# Patient Record
Sex: MALE | Employment: UNEMPLOYED | URBAN - METROPOLITAN AREA
[De-identification: names, ages, dates, MRNs, and addresses within clinical notes are randomized per-mention and may not be internally consistent; named-entity substitution may affect disease eponyms.]

---

## 2017-05-20 ENCOUNTER — HOSPITAL ENCOUNTER (INPATIENT)
Facility: HOSPITAL | Age: 57
LOS: 9 days | Discharge: HOME | DRG: 414 | End: 2017-05-29
Attending: HOSPITALIST | Admitting: HOSPITALIST
Payer: MEDICARE

## 2017-05-20 DIAGNOSIS — N39.0 URINARY TRACT INFECTION WITHOUT HEMATURIA, SITE UNSPECIFIED: ICD-10-CM

## 2017-05-20 DIAGNOSIS — I81 PORTAL VEIN THROMBOSIS: ICD-10-CM

## 2017-05-20 DIAGNOSIS — Z72.0 TOBACCO ABUSE: ICD-10-CM

## 2017-05-20 DIAGNOSIS — K80.43 CALCULUS OF BILE DUCT WITH ACUTE CHOLECYSTITIS AND OBSTRUCTION: ICD-10-CM

## 2017-05-20 DIAGNOSIS — E87.6 HYPOKALEMIA: ICD-10-CM

## 2017-05-20 DIAGNOSIS — F10.10 ALCOHOL ABUSE: ICD-10-CM

## 2017-05-20 DIAGNOSIS — E72.20 HYPERAMMONEMIA: ICD-10-CM

## 2017-05-20 DIAGNOSIS — K83.1 OBSTRUCTIVE JAUNDICE: Primary | ICD-10-CM

## 2017-05-20 DIAGNOSIS — Z79.899 MEDICATION DOSE CHANGED: ICD-10-CM

## 2017-05-20 PROBLEM — R74.01 TRANSAMINITIS: Status: ACTIVE | Noted: 2017-05-20

## 2017-05-20 PROBLEM — R82.71 BACTERIURIA: Status: ACTIVE | Noted: 2017-05-20

## 2017-05-20 PROBLEM — E88.09 HYPOALBUMINEMIA DUE TO PROTEIN-CALORIE MALNUTRITION: Status: ACTIVE | Noted: 2017-05-20

## 2017-05-20 PROBLEM — E83.39 HYPOPHOSPHATEMIA: Status: ACTIVE | Noted: 2017-05-20

## 2017-05-20 PROBLEM — D72.829 LEUKOCYTOSIS: Status: ACTIVE | Noted: 2017-05-20

## 2017-05-20 PROBLEM — E46 HYPOALBUMINEMIA DUE TO PROTEIN-CALORIE MALNUTRITION: Status: ACTIVE | Noted: 2017-05-20

## 2017-05-20 LAB
ABO + RH BLD: NORMAL
AFP SERPL-MCNC: 1.8 NG/ML
ALBUMIN SERPL BCP-MCNC: 2.3 G/DL
ALP SERPL-CCNC: 853 U/L
ALT SERPL W/O P-5'-P-CCNC: 206 U/L
AMMONIA PLAS-SCNC: 50 UMOL/L
ANION GAP SERPL CALC-SCNC: 10 MMOL/L
ANION GAP SERPL CALC-SCNC: 12 MMOL/L
AST SERPL-CCNC: 283 U/L
BASOPHILS # BLD AUTO: 0.03 K/UL
BASOPHILS NFR BLD: 0.2 %
BILIRUB SERPL-MCNC: 20.9 MG/DL
BLD GP AB SCN CELLS X3 SERPL QL: NORMAL
BUN SERPL-MCNC: 10 MG/DL
BUN SERPL-MCNC: 12 MG/DL
CALCIUM SERPL-MCNC: 8.4 MG/DL
CALCIUM SERPL-MCNC: 8.6 MG/DL
CANCER AG19-9 SERPL-ACNC: 394 U/ML
CHLORIDE SERPL-SCNC: 93 MMOL/L
CHLORIDE SERPL-SCNC: 98 MMOL/L
CO2 SERPL-SCNC: 26 MMOL/L
CO2 SERPL-SCNC: 28 MMOL/L
CREAT SERPL-MCNC: 0.6 MG/DL
CREAT SERPL-MCNC: 0.7 MG/DL
DIFFERENTIAL METHOD: ABNORMAL
EOSINOPHIL # BLD AUTO: 0 K/UL
EOSINOPHIL NFR BLD: 0.2 %
ERYTHROCYTE [DISTWIDTH] IN BLOOD BY AUTOMATED COUNT: 16.9 %
EST. GFR  (AFRICAN AMERICAN): >60 ML/MIN/1.73 M^2
EST. GFR  (AFRICAN AMERICAN): >60 ML/MIN/1.73 M^2
EST. GFR  (NON AFRICAN AMERICAN): >60 ML/MIN/1.73 M^2
EST. GFR  (NON AFRICAN AMERICAN): >60 ML/MIN/1.73 M^2
FOLATE SERPL-MCNC: 4.2 NG/ML
GLUCOSE SERPL-MCNC: 101 MG/DL
GLUCOSE SERPL-MCNC: 84 MG/DL
HCT VFR BLD AUTO: 33.1 %
HGB BLD-MCNC: 11.6 G/DL
HIV1+2 IGG SERPL QL IA.RAPID: NEGATIVE
INR PPP: 1.1
LIPASE SERPL-CCNC: 26 U/L
LYMPHOCYTES # BLD AUTO: 2.2 K/UL
LYMPHOCYTES NFR BLD: 13.4 %
MAGNESIUM SERPL-MCNC: 1.6 MG/DL
MCH RBC QN AUTO: 29.1 PG
MCHC RBC AUTO-ENTMCNC: 35 %
MCV RBC AUTO: 83 FL
MONOCYTES # BLD AUTO: 1.7 K/UL
MONOCYTES NFR BLD: 10.7 %
NEUTROPHILS # BLD AUTO: 12 K/UL
NEUTROPHILS NFR BLD: 74.6 %
PHOSPHATE SERPL-MCNC: 2.5 MG/DL
PLATELET # BLD AUTO: 326 K/UL
PMV BLD AUTO: 10.7 FL
POTASSIUM SERPL-SCNC: 2.7 MMOL/L
POTASSIUM SERPL-SCNC: 3.5 MMOL/L
PROT SERPL-MCNC: 5.9 G/DL
PROTHROMBIN TIME: 11.6 SEC
RBC # BLD AUTO: 3.98 M/UL
SODIUM SERPL-SCNC: 133 MMOL/L
SODIUM SERPL-SCNC: 134 MMOL/L
WBC # BLD AUTO: 16.1 K/UL

## 2017-05-20 PROCEDURE — 87086 URINE CULTURE/COLONY COUNT: CPT

## 2017-05-20 PROCEDURE — 99223 1ST HOSP IP/OBS HIGH 75: CPT | Mod: GC,,, | Performed by: HOSPITALIST

## 2017-05-20 PROCEDURE — 86901 BLOOD TYPING SEROLOGIC RH(D): CPT

## 2017-05-20 PROCEDURE — 82105 ALPHA-FETOPROTEIN SERUM: CPT

## 2017-05-20 PROCEDURE — 80053 COMPREHEN METABOLIC PANEL: CPT

## 2017-05-20 PROCEDURE — 86900 BLOOD TYPING SEROLOGIC ABO: CPT

## 2017-05-20 PROCEDURE — 82746 ASSAY OF FOLIC ACID SERUM: CPT

## 2017-05-20 PROCEDURE — 93010 ELECTROCARDIOGRAM REPORT: CPT | Mod: ,,, | Performed by: INTERNAL MEDICINE

## 2017-05-20 PROCEDURE — 83735 ASSAY OF MAGNESIUM: CPT

## 2017-05-20 PROCEDURE — 85610 PROTHROMBIN TIME: CPT

## 2017-05-20 PROCEDURE — 87077 CULTURE AEROBIC IDENTIFY: CPT

## 2017-05-20 PROCEDURE — 80074 ACUTE HEPATITIS PANEL: CPT

## 2017-05-20 PROCEDURE — 25000003 PHARM REV CODE 250

## 2017-05-20 PROCEDURE — 87088 URINE BACTERIA CULTURE: CPT

## 2017-05-20 PROCEDURE — 86301 IMMUNOASSAY TUMOR CA 19-9: CPT

## 2017-05-20 PROCEDURE — 82140 ASSAY OF AMMONIA: CPT

## 2017-05-20 PROCEDURE — 20600001 HC STEP DOWN PRIVATE ROOM

## 2017-05-20 PROCEDURE — 81001 URINALYSIS AUTO W/SCOPE: CPT

## 2017-05-20 PROCEDURE — 25000003 PHARM REV CODE 250: Performed by: HOSPITALIST

## 2017-05-20 PROCEDURE — 86703 HIV-1/HIV-2 1 RESULT ANTBDY: CPT

## 2017-05-20 PROCEDURE — 85025 COMPLETE CBC W/AUTO DIFF WBC: CPT

## 2017-05-20 PROCEDURE — 36415 COLL VENOUS BLD VENIPUNCTURE: CPT

## 2017-05-20 PROCEDURE — 84100 ASSAY OF PHOSPHORUS: CPT

## 2017-05-20 PROCEDURE — 80048 BASIC METABOLIC PNL TOTAL CA: CPT

## 2017-05-20 PROCEDURE — 93005 ELECTROCARDIOGRAM TRACING: CPT

## 2017-05-20 PROCEDURE — 87186 SC STD MICRODIL/AGAR DIL: CPT

## 2017-05-20 PROCEDURE — 83690 ASSAY OF LIPASE: CPT

## 2017-05-20 PROCEDURE — 63600175 PHARM REV CODE 636 W HCPCS: Performed by: INTERNAL MEDICINE

## 2017-05-20 PROCEDURE — 63600175 PHARM REV CODE 636 W HCPCS

## 2017-05-20 PROCEDURE — 82607 VITAMIN B-12: CPT

## 2017-05-20 PROCEDURE — 84425 ASSAY OF VITAMIN B-1: CPT

## 2017-05-20 PROCEDURE — 25000003 PHARM REV CODE 250: Performed by: INTERNAL MEDICINE

## 2017-05-20 RX ORDER — TRAMADOL HYDROCHLORIDE 50 MG/1
50 TABLET ORAL EVERY 6 HOURS PRN
Status: DISCONTINUED | OUTPATIENT
Start: 2017-05-20 | End: 2017-05-29 | Stop reason: HOSPADM

## 2017-05-20 RX ORDER — MAGNESIUM SULFATE HEPTAHYDRATE 40 MG/ML
2 INJECTION, SOLUTION INTRAVENOUS ONCE
Status: COMPLETED | OUTPATIENT
Start: 2017-05-20 | End: 2017-05-20

## 2017-05-20 RX ORDER — LACTULOSE 10 G/15ML
20 SOLUTION ORAL 2 TIMES DAILY
Status: DISCONTINUED | OUTPATIENT
Start: 2017-05-20 | End: 2017-05-20

## 2017-05-20 RX ORDER — GLUCAGON 1 MG
1 KIT INJECTION
Status: DISCONTINUED | OUTPATIENT
Start: 2017-05-20 | End: 2017-05-29 | Stop reason: HOSPADM

## 2017-05-20 RX ORDER — POTASSIUM CHLORIDE 7.45 MG/ML
10 INJECTION INTRAVENOUS
Status: COMPLETED | OUTPATIENT
Start: 2017-05-20 | End: 2017-05-20

## 2017-05-20 RX ORDER — DEXTROSE MONOHYDRATE AND SODIUM CHLORIDE 5; .45 G/100ML; G/100ML
INJECTION, SOLUTION INTRAVENOUS CONTINUOUS
Status: DISCONTINUED | OUTPATIENT
Start: 2017-05-20 | End: 2017-05-20

## 2017-05-20 RX ORDER — POTASSIUM CHLORIDE 7.45 MG/ML
20 INJECTION INTRAVENOUS ONCE
Status: DISCONTINUED | OUTPATIENT
Start: 2017-05-20 | End: 2017-05-20

## 2017-05-20 RX ORDER — ONDANSETRON 4 MG/1
4 TABLET, ORALLY DISINTEGRATING ORAL EVERY 6 HOURS PRN
Status: DISCONTINUED | OUTPATIENT
Start: 2017-05-20 | End: 2017-05-29 | Stop reason: HOSPADM

## 2017-05-20 RX ORDER — POTASSIUM CHLORIDE 750 MG/1
40 CAPSULE, EXTENDED RELEASE ORAL
Status: COMPLETED | OUTPATIENT
Start: 2017-05-20 | End: 2017-05-20

## 2017-05-20 RX ORDER — LACTULOSE 10 G/15ML
10 SOLUTION ORAL 2 TIMES DAILY
Status: DISCONTINUED | OUTPATIENT
Start: 2017-05-20 | End: 2017-05-23

## 2017-05-20 RX ORDER — SODIUM,POTASSIUM PHOSPHATES 280-250MG
1 POWDER IN PACKET (EA) ORAL
Status: COMPLETED | OUTPATIENT
Start: 2017-05-20 | End: 2017-05-21

## 2017-05-20 RX ORDER — THIAMINE HCL 100 MG
100 TABLET ORAL DAILY
Status: DISCONTINUED | OUTPATIENT
Start: 2017-05-20 | End: 2017-05-29 | Stop reason: HOSPADM

## 2017-05-20 RX ORDER — IBUPROFEN 200 MG
16 TABLET ORAL
Status: DISCONTINUED | OUTPATIENT
Start: 2017-05-20 | End: 2017-05-29 | Stop reason: HOSPADM

## 2017-05-20 RX ORDER — IBUPROFEN 200 MG
24 TABLET ORAL
Status: DISCONTINUED | OUTPATIENT
Start: 2017-05-20 | End: 2017-05-29 | Stop reason: HOSPADM

## 2017-05-20 RX ADMIN — MAGNESIUM SULFATE IN WATER 2 G: 40 INJECTION, SOLUTION INTRAVENOUS at 12:05

## 2017-05-20 RX ADMIN — POTASSIUM CHLORIDE 40 MEQ: 750 CAPSULE, EXTENDED RELEASE ORAL at 12:05

## 2017-05-20 RX ADMIN — THIAMINE HCL TAB 100 MG 100 MG: 100 TAB at 12:05

## 2017-05-20 RX ADMIN — POTASSIUM & SODIUM PHOSPHATES POWDER PACK 280-160-250 MG 1 PACKET: 280-160-250 PACK at 05:05

## 2017-05-20 RX ADMIN — POTASSIUM CHLORIDE 10 MEQ: 10 INJECTION, SOLUTION INTRAVENOUS at 09:05

## 2017-05-20 RX ADMIN — DEXTROSE AND SODIUM CHLORIDE: 5; .45 INJECTION, SOLUTION INTRAVENOUS at 06:05

## 2017-05-20 RX ADMIN — POTASSIUM CHLORIDE 40 MEQ: 750 CAPSULE, EXTENDED RELEASE ORAL at 09:05

## 2017-05-20 RX ADMIN — POTASSIUM & SODIUM PHOSPHATES POWDER PACK 280-160-250 MG 1 PACKET: 280-160-250 PACK at 12:05

## 2017-05-20 RX ADMIN — POTASSIUM CHLORIDE 10 MEQ: 10 INJECTION, SOLUTION INTRAVENOUS at 12:05

## 2017-05-20 RX ADMIN — FOLIC ACID: 5 INJECTION, SOLUTION INTRAMUSCULAR; INTRAVENOUS; SUBCUTANEOUS at 12:05

## 2017-05-20 RX ADMIN — LACTULOSE 10 G: 20 SOLUTION ORAL at 09:05

## 2017-05-20 RX ADMIN — POTASSIUM & SODIUM PHOSPHATES POWDER PACK 280-160-250 MG 1 PACKET: 280-160-250 PACK at 09:05

## 2017-05-20 RX ADMIN — POTASSIUM CHLORIDE 10 MEQ: 10 INJECTION, SOLUTION INTRAVENOUS at 01:05

## 2017-05-20 RX ADMIN — POTASSIUM CHLORIDE 10 MEQ: 10 INJECTION, SOLUTION INTRAVENOUS at 10:05

## 2017-05-20 NOTE — CONSULTS
[facility]  General Surgery  Consult Note    Patient Name: Jimmy Pink  MRN: [id number]  Code Status: Full Code  Admission Date: 5/20/2017  Hospital Length of Stay: 0 days  Attending Physician: Abraham Gordillo MD  Primary Care Provider: Primary Doctor No    Patient information was obtained from patient.     Inpatient consult to Surgical Oncology  Consult performed by: AMALIA EVANGELISTA  Consult ordered by: TIFFANIE WILLIS        Subjective:     Principal Problem: Obstructive jaundice    History of Present Illness: 57 y/o male with h/o alcohol and tobacco abuse, currently living in a homeless shelter, who was transferred from [place] due to obstructive jaundice.  He was noted to have orange urine and fatigue, and was sent to ED.  Labs demonstrated t bili of 21.5, alk phos 697, INR of 1.3, and wbc of 18.  Labs from 2 yrs ago all wnl.  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  He states he has lost some weight.      He specifically denies abd pain, fevers, chills, itching.      He states he stopped drinking a few weeks ago.      No major medical or surgical history. No family history of malignancy.          No current facility-administered medications on file prior to encounter.      No current outpatient prescriptions on file prior to encounter.       Review of patient's allergies indicates:  No Known Allergies    Past Medical History:   Diagnosis Date    Alcohol abuse     History of fracture of finger     Tobacco abuse      No past surgical history on file.  Family History     Problem Relation (Age of Onset)    Heart disease Father    No Known Problems Sister        Social History Main Topics    Smoking status: Current Every Day Smoker     Packs/day: 0.50     Years: 20.00     Types: Cigarettes     Start date: 5/20/1997    Smokeless tobacco: Not on file    Alcohol use 1.8 oz/week     3 Cans  of beer per week    Drug use: No    Sexual activity: Yes     Review of Systems   Constitutional: Negative for appetite change, chills, fatigue, fever and unexpected weight change.   HENT: Negative for nosebleeds, sore throat and trouble swallowing.    Eyes: Negative for photophobia and visual disturbance.   Respiratory: Negative for cough and shortness of breath.    Cardiovascular: Negative for chest pain, palpitations and leg swelling.   Gastrointestinal: Negative for abdominal distention, abdominal pain, blood in stool, constipation, diarrhea, nausea and vomiting.   Endocrine: Negative for polyphagia and polyuria.   Genitourinary: Negative for dysuria and hematuria.        Orange urine   Musculoskeletal: Negative for arthralgias, back pain and myalgias.   Skin: Positive for color change. Negative for rash and wound.        No pruritis   Neurological: Negative for dizziness, light-headedness, numbness and headaches.   Hematological: Negative for adenopathy. Does not bruise/bleed easily.   Psychiatric/Behavioral: Negative for confusion. The patient is not nervous/anxious.      Objective:     Vital Signs (Most Recent):  Temp: 98.5 °F (36.9 °C) (05/20/17 1230)  Pulse: 81 (05/20/17 1230)  Resp: 16 (05/20/17 1230)  BP: 123/71 (05/20/17 1230)  SpO2: (!) 92 % (05/20/17 1230) Vital Signs (24h Range):  Temp:  [98.3 °F (36.8 °C)-98.5 °F (36.9 °C)] 98.5 °F (36.9 °C)  Pulse:  [81-91] 81  Resp:  [16] 16  SpO2:  [92 %-94 %] 92 %  BP: (123-133)/(66-73) 123/71     Weight: 90.9 kg (200 lb 4.8 oz)  Body mass index is 28.74 kg/(m^2).    Physical Exam   Constitutional: He is oriented to person, place, and time. He appears well-developed and well-nourished. No distress.   HENT:   Head: Normocephalic and atraumatic.   Mouth/Throat: Oropharynx is clear and moist.   Poor dentition   Eyes: EOM are normal. Pupils are equal, round, and reactive to light. Scleral icterus is present.   Neck: Normal range of motion. Neck supple.    Cardiovascular: Normal rate, regular rhythm, normal heart sounds and intact distal pulses.    No murmur heard.  Pulmonary/Chest: Effort normal and breath sounds normal. No respiratory distress. He has no wheezes. He has no rales.   Abdominal: Soft. Bowel sounds are normal. He exhibits distension and ascites. There is no tenderness. There is no CVA tenderness.   Musculoskeletal: Normal range of motion. He exhibits no edema, tenderness or deformity.   Neurological: He is alert and oriented to person, place, and time.   Oriented to person, place, time, situation, president, .   Skin: Skin is warm and dry. He is not diaphoretic.   Diffuse jaundice   Psychiatric: His behavior is normal.   Thought processing seems slow, but answers questions appropriately and accurately.  Flat affect.     Vitals reviewed.      Significant Labs:  CBC:   Recent Labs  Lab 05/20/17  0531   WBC 16.10*   RBC 3.98*   HGB 11.6*   HCT 33.1*      MCV 83   MCH 29.1   MCHC 35.0     CMP:   Recent Labs  Lab 05/20/17  0530 05/20/17  1258   GLU 84 101   CALCIUM 8.6* 8.4*   ALBUMIN 2.3*  --    PROT 5.9*  --    * 134*   K 2.7* 3.5   CO2 28 26   CL 93* 98   BUN 12 10   CREATININE 0.7 0.6   ALKPHOS 853*  --    *  --    *  --    BILITOT 20.9*  --      Coagulation:   Recent Labs  Lab 05/20/17  0530   INR 1.1     Ca 19-9: 394    Significant Diagnostics:  OSH CT reviewed - significant intra- and extra-hepatic biliary dilitation; no discrete pancreatic head mass noted; some retroperitoneal adenopathy noted    Assessment/Plan:     * Obstructive jaundice  Agree with pancreas protocol CT.   Agree with AES consult for evaluation (biopsy and stent) - will follow up results from Monday's procedure.    Please watch for signs of worsening obstruction / cholangitis.   Please monitor for alcohol withdrawal.  Optimize nutrition.   Will cont to follow.    VTE Risk Mitigation         Ordered     Place NATHAN hose  Until discontinued       05/20/17 0559     Place sequential compression device  Until discontinued      05/20/17 0514          Thank you for your consult. I will follow-up with patient. Please contact us if you have any additional questions.    Jorge A Prado MD  General Surgery, PGY-V  [phone]

## 2017-05-20 NOTE — ASSESSMENT & PLAN NOTE
-Chronic use for decades, 6 beers daily  -No history of withdrawal, no prior hospitalizations due to alcohol-related issues, no known varices  -Last use ~2 weeks ago, though questionable historian  -Monitor for signs of withdrawal  -Follow up thiamine, folate and vit B12 levels and replete PRN

## 2017-05-20 NOTE — SUBJECTIVE & OBJECTIVE
Past Medical History:   Diagnosis Date    Alcohol abuse     History of fracture of finger     Tobacco abuse        No past surgical history on file.    Review of patient's allergies indicates:  No Known Allergies    No current facility-administered medications on file prior to encounter.      No current outpatient prescriptions on file prior to encounter.     Family History     Problem Relation (Age of Onset)    Heart disease Father    No Known Problems Sister        Social History Main Topics    Smoking status: Current Every Day Smoker     Packs/day: 0.50     Years: 20.00     Types: Cigarettes     Start date: 5/20/1997    Smokeless tobacco: Not on file    Alcohol use 1.8 oz/week     3 Cans of beer per week    Drug use: No    Sexual activity: Yes     Review of Systems   Constitutional: Negative for appetite change, chills, fatigue, fever and unexpected weight change.   HENT: Negative for nosebleeds, sore throat and trouble swallowing.    Eyes: Negative for photophobia and visual disturbance.   Respiratory: Negative for cough and shortness of breath.    Cardiovascular: Negative for chest pain, palpitations and leg swelling.   Gastrointestinal: Negative for abdominal distention, abdominal pain, blood in stool, constipation, diarrhea, nausea and vomiting.   Endocrine: Negative for polyphagia and polyuria.   Genitourinary: Negative for dysuria and hematuria.        Orange urine   Musculoskeletal: Negative for arthralgias, back pain and myalgias.   Skin: Positive for color change. Negative for rash and wound.        No pruritis   Neurological: Negative for dizziness, light-headedness, numbness and headaches.   Hematological: Negative for adenopathy. Does not bruise/bleed easily.   Psychiatric/Behavioral: Negative for confusion. The patient is not nervous/anxious.      Objective:     Vital Signs (Most Recent):  Temp: 98.3 °F (36.8 °C) (05/20/17 0458)  Pulse: 86 (05/20/17 0458)  Resp: 16 (05/20/17 0458)  BP: 133/73  (05/20/17 0458)  SpO2: (!) 94 % (05/20/17 0458) Vital Signs (24h Range):  Temp:  [98.3 °F (36.8 °C)] 98.3 °F (36.8 °C)  Pulse:  [86] 86  Resp:  [16] 16  SpO2:  [94 %] 94 %  BP: (133)/(73) 133/73     Weight: 90.9 kg (200 lb 4.8 oz)  Body mass index is 28.74 kg/(m^2).    Physical Exam   Constitutional: He is oriented to person, place, and time. He appears well-developed and well-nourished. No distress.   HENT:   Head: Normocephalic and atraumatic.   Mouth/Throat: Oropharynx is clear and moist.   Poor dentition   Eyes: EOM are normal. Pupils are equal, round, and reactive to light. Scleral icterus is present.   Neck: Normal range of motion. Neck supple.   Cardiovascular: Normal rate, regular rhythm, normal heart sounds and intact distal pulses.    No murmur heard.  Pulmonary/Chest: Effort normal and breath sounds normal. No respiratory distress. He has no wheezes. He has no rales.   Abdominal: Soft. Bowel sounds are normal. He exhibits distension and ascites. There is no tenderness. There is no CVA tenderness.   No hepatic flap   Musculoskeletal: Normal range of motion. He exhibits no edema, tenderness or deformity.   Neurological: He is alert and oriented to person, place, and time.   Oriented to person, place, time, situation, president, .   Skin: Skin is warm and dry. He is not diaphoretic.   Diffuse jaundice   Psychiatric: His behavior is normal.   Thought processing seems slow, but answers questions appropriately and accurately.  Flat affect.     Nursing note and vitals reviewed.       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

## 2017-05-20 NOTE — SUBJECTIVE & OBJECTIVE
No current facility-administered medications on file prior to encounter.      No current outpatient prescriptions on file prior to encounter.       Review of patient's allergies indicates:  No Known Allergies    Past Medical History:   Diagnosis Date    Alcohol abuse     History of fracture of finger     Tobacco abuse      No past surgical history on file.  Family History     Problem Relation (Age of Onset)    Heart disease Father    No Known Problems Sister        Social History Main Topics    Smoking status: Current Every Day Smoker     Packs/day: 0.50     Years: 20.00     Types: Cigarettes     Start date: 5/20/1997    Smokeless tobacco: Not on file    Alcohol use 1.8 oz/week     3 Cans of beer per week    Drug use: No    Sexual activity: Yes     Review of Systems   Constitutional: Negative for appetite change, chills, fatigue, fever and unexpected weight change.   HENT: Negative for nosebleeds, sore throat and trouble swallowing.    Eyes: Negative for photophobia and visual disturbance.   Respiratory: Negative for cough and shortness of breath.    Cardiovascular: Negative for chest pain, palpitations and leg swelling.   Gastrointestinal: Negative for abdominal distention, abdominal pain, blood in stool, constipation, diarrhea, nausea and vomiting.   Endocrine: Negative for polyphagia and polyuria.   Genitourinary: Negative for dysuria and hematuria.        Orange urine   Musculoskeletal: Negative for arthralgias, back pain and myalgias.   Skin: Positive for color change. Negative for rash and wound.        No pruritis   Neurological: Negative for dizziness, light-headedness, numbness and headaches.   Hematological: Negative for adenopathy. Does not bruise/bleed easily.   Psychiatric/Behavioral: Negative for confusion. The patient is not nervous/anxious.      Objective:     Vital Signs (Most Recent):  Temp: 98.5 °F (36.9 °C) (05/20/17 1230)  Pulse: 81 (05/20/17 1230)  Resp: 16 (05/20/17 1230)  BP: 123/71  (05/20/17 1230)  SpO2: (!) 92 % (05/20/17 1230) Vital Signs (24h Range):  Temp:  [98.3 °F (36.8 °C)-98.5 °F (36.9 °C)] 98.5 °F (36.9 °C)  Pulse:  [81-91] 81  Resp:  [16] 16  SpO2:  [92 %-94 %] 92 %  BP: (123-133)/(66-73) 123/71     Weight: 90.9 kg (200 lb 4.8 oz)  Body mass index is 28.74 kg/(m^2).    Physical Exam   Constitutional: He is oriented to person, place, and time. He appears well-developed and well-nourished. No distress.   HENT:   Head: Normocephalic and atraumatic.   Mouth/Throat: Oropharynx is clear and moist.   Poor dentition   Eyes: EOM are normal. Pupils are equal, round, and reactive to light. Scleral icterus is present.   Neck: Normal range of motion. Neck supple.   Cardiovascular: Normal rate, regular rhythm, normal heart sounds and intact distal pulses.    No murmur heard.  Pulmonary/Chest: Effort normal and breath sounds normal. No respiratory distress. He has no wheezes. He has no rales.   Abdominal: Soft. Bowel sounds are normal. He exhibits distension and ascites. There is no tenderness. There is no CVA tenderness.   Musculoskeletal: Normal range of motion. He exhibits no edema, tenderness or deformity.   Neurological: He is alert and oriented to person, place, and time.   Oriented to person, place, time, situation, president, .   Skin: Skin is warm and dry. He is not diaphoretic.   Diffuse jaundice   Psychiatric: His behavior is normal.   Thought processing seems slow, but answers questions appropriately and accurately.  Flat affect.     Vitals reviewed.      Significant Labs:  CBC:   Recent Labs  Lab 05/20/17  0531   WBC 16.10*   RBC 3.98*   HGB 11.6*   HCT 33.1*      MCV 83   MCH 29.1   MCHC 35.0     CMP:   Recent Labs  Lab 05/20/17  0530 05/20/17  1258   GLU 84 101   CALCIUM 8.6* 8.4*   ALBUMIN 2.3*  --    PROT 5.9*  --    * 134*   K 2.7* 3.5   CO2 28 26   CL 93* 98   BUN 12 10   CREATININE 0.7 0.6   ALKPHOS 853*  --    *  --    *  --    BILITOT  20.9*  --      Coagulation:   Recent Labs  Lab 05/20/17  0530   INR 1.1       Significant Diagnostics:  OSH CT reviewed - significant intra- and extra-hepatic biliary dilitation; no discrete pancreatic head mass noted; some retroperitoneal adenopathy noted

## 2017-05-20 NOTE — PLAN OF CARE
Outside Transfer Acceptance Note    Transferring Physician or Mid Level Provider/Speciality: Dr. Tadeo       Accepting Physician: Kevin Sorenson     Date of Acceptance: 05/20/2017     Code Status: Full     Transferring Facility/Hospital: [facility]     Reason for Transfer to [facility]: [facility] evaluation of obstructive jaundice     Report from Transferring Physician or Mid-Level provider/Hospital course:     56 M with h/o alcohol abuse who lives at a homeless shelter presents as a transfer from [facility] for [facility] evaluation of obstructive jaundice. Patient was taken to OSH last night by homeless shelter with painless jaundice and lethargy. Labs showed abnormal LFT with bilirubin 21.5, Alk-P 697, , , NH3 87, INR 1.3, Platelet 310 ( Patient had normal LFT in July 2015 ) and elevated WBC 18. Afebrile and vitals stable.     Finding of CT abdomen concerning for periampullary mass with intra and extrahepatic dilatation, PVT,  mild ascites.     Denies weight loss, bleeding, fever, chills. Quits drinking about 2 months ago.     Patient is currently conversant, AAO X 3 per Dr. Tadeo. Will get lactulose 20 gm x 1 given elevated ammonia.      [facility] discussed case with Dr. Carvalho from [facility] who is willing to assist with patient's care.          To do list upon patient arrival: - AES consult for EUS, +/- ERCP                                                        - Check AFP, CA 19-9                                                       - Assess for hepatic encephalopathy     Please call extension [phone] upon patient arrival to floor for Hospital Medicine admit team assignment and for additional admit orders. If patient is coming from another [facility] facility please also call [phone] to inform the admit team/office that patient has arrived from the [facility] facility to the floor so patient can be evaluated.      Kevin Sorenson,DO  Attending Staff Physician   Hospital Medicine

## 2017-05-20 NOTE — PLAN OF CARE
Problem: Patient Care Overview  Goal: Plan of Care Review  Outcome: Ongoing (interventions implemented as appropriate)  Pt has been very compliant with care and cooperative. Appears sad/has flat affect most of time. Attempted to converse with pt, but pt just stares at television. No s/sx of alcohol withdrawal noted. Pt ate lunch, but when asked to order supper at 1745, but refused. Denied nausea, just did not have an appetite. Encouraged patient to order something for later just in case. Denies pain. VSS. Ambulates to BR. Urine dark avril in color. Eyes jaundiced. AAOx4. Call light within reach.

## 2017-05-20 NOTE — PROGRESS NOTES
Pt off the floor, going to US. Currently has IV potassium infusing. Left on stretcher via transport.

## 2017-05-20 NOTE — H&P
"[facility] Hospital Medicine  History & Physical    Patient Name: Jimmy Pink  MRN: [id number]  Admission Date: 5/20/2017  Attending Physician: Abraham Gordillo MD   Primary Care Provider: Primary Doctor [facility] Medicine Team: [facility] HOSP MED 4 Rosina Parisi MD     Patient information was obtained from patient and past medical records.     Subjective:     Principal Problem:Obstructive jaundice    Chief Complaint: "Pissing orange"    HPI: Mr. Jimmy Pink is a 57 yo homeless male with a PMH significant for alcohol abuse, last use 2 weeks ago, and current smoker who was brought to the ED at [facility] by one of the homeless shelter employees after she noticed jaundice 1 week prior that had rapidly become progressively worse.  Pt also complained of "pissing orange."  At the OSH he was found to have markedly elevated alk phos, ammonia and Tbili.  A CT abdomen was performed with dilation of common bile duct and intrahepatic ducts as well as a "poorly visualized periampullary neoplasm" within or adjacent to distal common duct and portal vein thrombosis.  In discussion with Dr. Carvalho, it was felt pt should be transferred to [facility] for further evaluation by advanced endoscopy.  Anticoagulation was held due to elevated INR and liver disease with potential procedure.      Upon evaluation at [facility], the pt denied N/V/abdo pain/diarrhea/fevers/chills.  Pt does not recognize that his skin appears yellow.  He was not encephalopathic on exam.      The patient has no known history of alcohol withdrawal and has never been diagnosed with liver cirrhosis or had bleeding complications of alcoholic liver disease.  He reported smoking for past 5 years ~1/2 ppd.  He has been drinking alcohol since his teens and stated he "used to drink a lot more" though quit ~2 weeks ago because he "no longer had the urge to drink."  Per OSH records, it appears he was drinking 6 beers daily prior.  He denies any recreational " drug use.  He lives in [facility] in [place] and is on disability for undisclosed disablement receiving $700 monthly.  He has been unemployed and homeless for the past three years.  Prior to that he worked as a  at [employer].  His family history is significant for father who passed from MI and mother passed from old age.  He has 5 sisters who are in good health.  No known family history of cancer.        Past Medical History:   Diagnosis Date    Alcohol abuse     History of fracture of finger     Tobacco abuse        No past surgical history on file.    Review of patient's allergies indicates:  No Known Allergies    No current facility-administered medications on file prior to encounter.      No current outpatient prescriptions on file prior to encounter.     Family History     Problem Relation (Age of Onset)    Heart disease Father    No Known Problems Sister        Social History Main Topics    Smoking status: Current Every Day Smoker     Packs/day: 0.50     Years: 20.00     Types: Cigarettes     Start date: 5/20/1997    Smokeless tobacco: Not on file    Alcohol use 1.8 oz/week     3 Cans of beer per week    Drug use: No    Sexual activity: Yes     Review of Systems   Constitutional: Negative for appetite change, chills, fatigue, fever and unexpected weight change.   HENT: Negative for nosebleeds, sore throat and trouble swallowing.    Eyes: Negative for photophobia and visual disturbance.   Respiratory: Negative for cough and shortness of breath.    Cardiovascular: Negative for chest pain, palpitations and leg swelling.   Gastrointestinal: Negative for abdominal distention, abdominal pain, blood in stool, constipation, diarrhea, nausea and vomiting.   Endocrine: Negative for polyphagia and polyuria.   Genitourinary: Negative for dysuria and hematuria.        Orange urine   Musculoskeletal: Negative for arthralgias, back pain and myalgias.   Skin: Positive for color change.  Negative for rash and wound.        No pruritis   Neurological: Negative for dizziness, light-headedness, numbness and headaches.   Hematological: Negative for adenopathy. Does not bruise/bleed easily.   Psychiatric/Behavioral: Negative for confusion. The patient is not nervous/anxious.      Objective:     Vital Signs (Most Recent):  Temp: 98.3 °F (36.8 °C) (05/20/17 0458)  Pulse: 86 (05/20/17 0458)  Resp: 16 (05/20/17 0458)  BP: 133/73 (05/20/17 0458)  SpO2: (!) 94 % (05/20/17 0458) Vital Signs (24h Range):  Temp:  [98.3 °F (36.8 °C)] 98.3 °F (36.8 °C)  Pulse:  [86] 86  Resp:  [16] 16  SpO2:  [94 %] 94 %  BP: (133)/(73) 133/73     Weight: 90.9 kg (200 lb 4.8 oz)  Body mass index is 28.74 kg/(m^2).    Physical Exam   Constitutional: He is oriented to person, place, and time. He appears well-developed and well-nourished. No distress.   HENT:   Head: Normocephalic and atraumatic.   Mouth/Throat: Oropharynx is clear and moist.   Poor dentition   Eyes: EOM are normal. Pupils are equal, round, and reactive to light. Scleral icterus is present.   Neck: Normal range of motion. Neck supple.   Cardiovascular: Normal rate, regular rhythm, normal heart sounds and intact distal pulses.    No murmur heard.  Pulmonary/Chest: Effort normal and breath sounds normal. No respiratory distress. He has no wheezes. He has no rales.   Abdominal: Soft. Bowel sounds are normal. He exhibits distension and ascites. There is no tenderness. There is no CVA tenderness.   No hepatic flap   Musculoskeletal: Normal range of motion. He exhibits no edema, tenderness or deformity.   Neurological: He is alert and oriented to person, place, and time.   Oriented to person, place, time, situation, president, .   Skin: Skin is warm and dry. He is not diaphoretic.   Diffuse jaundice   Psychiatric: His behavior is normal.   Thought processing seems slow, but answers questions appropriately and accurately.  Flat affect.     Nursing note and  vitals reviewed.       Significant Labs:   Outside Hospital Labs from 5/19  Na+ 133  K+ 3.0  Bun 12  Cr 0.84  Alk phos 697      Amylase 60  Lipase 40  Ammonia 97  WBC 18.3  Hb 12.1    UA with bacteria    Significant Imaging:   Outside Hospital CT Abdo from 5/19  -Dilation of common duct and intrahepatic ducts  -Poorly visualized periampullary neoplasm within or adjacent to distal common duct  -Portal vein thrombosis  -Nonspecific fat stranding in root of mesentery  -Trace ascites    Assessment/Plan:     * Obstructive jaundice  -OSH labs:  Tbili 21.5, Alk phos 697, ,   -OSH CT abdo from 5/19 concerning for periampullary mass  -Follow up tumor markers AFP and Ca 19-9  -Hepatitis panel pending  -Daily CMP  -Advanced endoscopy consult, follow up recs    Portal vein thrombosis  -As observed on CT at OSH on 5/19 (disc in patient's chart)  -Holding anticoagulation due to possible procedure and liver disease with elevated INR  -Obtain ultrasound liver with doppler for further assessment    Hyperammonemia  -Ammonia at OSH 97  -No hepatic flap or encephalopathy on exam  -Lactulose 20 g bid    Alcohol abuse  -Chronic use for decades, 6 beers daily  -No history of withdrawal, no prior hospitalizations due to alcohol-related issues, no known varices  -Last use ~2 weeks ago, though questionable historian  -Monitor for signs of withdrawal  -Follow up thiamine, folate and vit B12 levels and replete PRN    Leukocytosis  Bacteriuria  -UA at OSH with bacteria and leukocytosis 16.1  -Asymptomatic  -Repeat UA with culture  -1 g IV ceftriaxone    Tobacco abuse  - on cessation   -Pt declined nicotine patch      VTE Risk Mitigation         Ordered     Place NATHAN hose  Until discontinued      05/20/17 0559     Place sequential compression device  Until discontinued      05/20/17 0514        Rosina Parisi MD  Department of Hospital Medicine   [facility]

## 2017-05-20 NOTE — ASSESSMENT & PLAN NOTE
-UA at OSH with bacteria and leukocytosis 16.1  -Asymptomatic  -Repeat UA with culture  -1 g IV ceftriaxone

## 2017-05-20 NOTE — ASSESSMENT & PLAN NOTE
-OSH labs:  Tbili 21.5, Alk phos 697, ,   -OSH CT abdo from 5/19 concerning for periampullary mass  -Follow up tumor markers AFP and Ca 19-9  -Hepatitis panel pending  -Daily CMP  -Advanced endoscopy consult, follow up recs

## 2017-05-20 NOTE — PROGRESS NOTES
"Reported patients critical potassium level to IM4, informed pt to remain NPO for US of liver, and notified him of an EKG to be done at bedside. Pt reports he feels "totally fine". Specimen cup at bedside to obtain urine sample.   "

## 2017-05-20 NOTE — ASSESSMENT & PLAN NOTE
Agree with pancreas protocol CT.   Agree with AES consult for evaluation (biopsy and stent) - will follow up results from Monday's procedure.    Please watch for signs of worsening obstruction / cholangitis.   Will cont to follow.

## 2017-05-20 NOTE — CONSULTS
[facility]  Gastroenterology  Consult Note    Patient Name: Jimmy Pink  MRN: [id number]  Admission Date: 5/20/2017  Hospital Length of Stay: 0 days  Code Status: Full Code   Attending Provider: Abraham Gordillo MD   Consulting Provider: Josef Stewart MD  Primary Care Physician: Primary Doctor No  Principal Problem:Obstructive jaundice    Inpatient consult to [facility] (AES)  Consult performed by: JOSEF STEWART  Consult ordered by: RUBINA MORELAND  Reason for consult: painless jaundice        Subjective:     HPI:    Jimmy Pink is a 56 y.o. male with h/o alcohol/tobacco abuse, homeless living at shelter presented as a transfer from [facility] ED for AES evaluation of obstructive jaundice where he initially presented with orange color urine and pale stools and lethargy. Labs showed abnormal LFT with bilirubin 21.5, Alk-P 697, , , NH3 87, INR 1.3, Platelet 310 ( Patient had normal LFT in July 2015 ) and elevated WBC 18. Afebrile and vitals stable. CT there obtained showed periampullary mass with intra and extrahepatic dilatation, PVT, mild ascites. He denies having weight loss, abdominal pain, bleeding, fever, chills or itching. Quits drinking about 2 months ago. No family history of pancreatic cancer.    Review of Systems:  Constitutional: No fever, chills.   Eyes: no visual changes, no diplopia, no eye discharge  ENT: no sore throat or runny nose.  Respiratory: no cough or shortness of breath    Cardiovascular: no chest pain or palpitations    Gastrointestinal: as per HPI  Hematologic/Lymphatic: No petechia, no lymphadenopathy  Musculoskeletal: no arthralgias or myalgias    Neurological: no seizures, tremors   Behavioral/Psych: No suicidal ideation, no hallucination  Skin: No rash, no raynaud's    Past Medical History: has a past medical history of Alcohol abuse; History of fracture of finger; and Tobacco abuse.    Past Surgical  History: Denies prior surgeries    Family History:family history includes Heart disease in his father; No Known Problems in his sister.    Allergies: Reviewed in EPIC.     Social History: reports that he has been smoking Cigarettes.  He started smoking about 20 years ago. He has a 10.00 pack-year smoking history. He does not have any smokeless tobacco history on file. He reports that he drinks about 1.8 oz of alcohol per week  He reports that he does not use illicit drugs.    Home medications:   No current facility-administered medications on file prior to encounter.      No current outpatient prescriptions on file prior to encounter.       Scheduled Meds:    lactulose  10 g Oral BID    magnesium sulfate IVPB  2 g Intravenous Once    potassium chloride  10 mEq Intravenous Q1H    potassium chloride  40 mEq Oral Q2H    potassium, sodium phosphates  1 packet Oral QID (WM & HS)    Banana bag   Intravenous Once    thiamine  100 mg Oral Daily       Continuous Infusions:      PRN Meds: dextrose 50%, dextrose 50%, glucagon (human recombinant), glucose, glucose, ondansetron, tramadol    Vital signs:  Temp:  [98.3 °F (36.8 °C)]   Pulse:  [86-91]   Resp:  [16]   BP: (128-133)/(66-73)   SpO2:  [93 %-94 %]     Physical exam:  General: No acute distress, obese  HEENT: Head: Normocephalic, atraumatic.   Eyes: Sclera icteric. EOMI.   Neck: Supple  Heart: Regular rate and rhythm, no gallops  Lungs: Non labored breathing, no wheezing  Abdomen: Bowel sounds normal, no organomegaly, masses, or hernia. No tenderness, no rebound or guarding. No distention.   Rectal: Deferred  Extremities: No deformities, no C/C/E  Neurologic: Able to follow commands and move 4 extremities. AOX3  Skin: No rash, icteric    Routine labs:    Recent Labs  Lab 05/20/17  0531   WBC 16.10*   HGB 11.6*   HCT 33.1*      MCV 83       Recent Labs  Lab 05/20/17  0530   INR 1.1       Recent Labs  Lab 05/20/17  0530   *   K 2.7*   CO2 28   BUN 12    CREATININE 0.7       Recent Labs  Lab 05/20/17  0530   ALBUMIN 2.3*   ALKPHOS 853*   *   *   BILITOT 20.9*     Imaging and prior endoscopies  As above    Assessment/Plan:     Jimmy Pink is a 56 y.o. male with painless obstructive jaundice and pancreatic mass.     * Obstructive jaundice  .    Portal vein thrombosis  .    Alcohol abuse  .    Tobacco abuse  .    Plan:  Discontinue all NSAIDs and Heparin products  Please correct any coagulopathy with platelets and FFP to a goal of platelets >50K and INR <2.0  Maintain IV access   NPO PMN Sunday  OK for diet today and tomorrow.   Plan for EUS Monday  Obtain pancreatic protocol CT  Consult surg oncology  No need for anticoagulation of PVT as of now   Please notify AES team if there is significant change in patient's clinical status       Thank you for your consult.     Hedy Johnston MD  Gastroenterology  [facility]

## 2017-05-20 NOTE — ASSESSMENT & PLAN NOTE
-As observed on CT at OSH on 5/19 (disc in patient's chart)  -Holding anticoagulation due to possible procedure and liver disease with elevated INR  -Obtain ultrasound liver with doppler for further assessment

## 2017-05-21 LAB
ALBUMIN SERPL BCP-MCNC: 2 G/DL
ALP SERPL-CCNC: 690 U/L
ALT SERPL W/O P-5'-P-CCNC: 155 U/L
AMORPH CRY UR QL COMP ASSIST: ABNORMAL
ANION GAP SERPL CALC-SCNC: 9 MMOL/L
AST SERPL-CCNC: 139 U/L
BACTERIA #/AREA URNS AUTO: ABNORMAL /HPF
BASOPHILS # BLD AUTO: 0.04 K/UL
BASOPHILS NFR BLD: 0.3 %
BILIRUB SERPL-MCNC: 10.7 MG/DL
BILIRUB UR QL STRIP: ABNORMAL
BUN SERPL-MCNC: 14 MG/DL
CALCIUM SERPL-MCNC: 8.6 MG/DL
CHLORIDE SERPL-SCNC: 101 MMOL/L
CLARITY UR REFRACT.AUTO: ABNORMAL
CO2 SERPL-SCNC: 25 MMOL/L
COLOR UR AUTO: ABNORMAL
CREAT SERPL-MCNC: 0.7 MG/DL
DIFFERENTIAL METHOD: ABNORMAL
EOSINOPHIL # BLD AUTO: 0.1 K/UL
EOSINOPHIL NFR BLD: 0.4 %
ERYTHROCYTE [DISTWIDTH] IN BLOOD BY AUTOMATED COUNT: 17.4 %
EST. GFR  (AFRICAN AMERICAN): >60 ML/MIN/1.73 M^2
EST. GFR  (NON AFRICAN AMERICAN): >60 ML/MIN/1.73 M^2
GLUCOSE SERPL-MCNC: 130 MG/DL
GLUCOSE UR QL STRIP: NEGATIVE
HCT VFR BLD AUTO: 31.6 %
HGB BLD-MCNC: 11.1 G/DL
HGB UR QL STRIP: ABNORMAL
HYALINE CASTS UR QL AUTO: 3 /LPF
KETONES UR QL STRIP: NEGATIVE
LEUKOCYTE ESTERASE UR QL STRIP: NEGATIVE
LYMPHOCYTES # BLD AUTO: 2 K/UL
LYMPHOCYTES NFR BLD: 13.9 %
MCH RBC QN AUTO: 29.4 PG
MCHC RBC AUTO-ENTMCNC: 35.1 %
MCV RBC AUTO: 84 FL
MICROSCOPIC COMMENT: ABNORMAL
MONOCYTES # BLD AUTO: 1.7 K/UL
MONOCYTES NFR BLD: 11.8 %
NEUTROPHILS # BLD AUTO: 10.7 K/UL
NEUTROPHILS NFR BLD: 72.4 %
NITRITE UR QL STRIP: NEGATIVE
PH UR STRIP: 5 [PH] (ref 5–8)
PLATELET # BLD AUTO: 324 K/UL
PMV BLD AUTO: 10.2 FL
POTASSIUM SERPL-SCNC: 3.5 MMOL/L
PROT SERPL-MCNC: 5.6 G/DL
PROT UR QL STRIP: NEGATIVE
RBC # BLD AUTO: 3.78 M/UL
RBC #/AREA URNS AUTO: 1 /HPF (ref 0–4)
SODIUM SERPL-SCNC: 135 MMOL/L
SP GR UR STRIP: >=1.03 (ref 1–1.03)
SQUAMOUS #/AREA URNS AUTO: 1 /HPF
URN SPEC COLLECT METH UR: ABNORMAL
UROBILINOGEN UR STRIP-ACNC: 4 EU/DL
WBC # BLD AUTO: 14.71 K/UL
WBC #/AREA URNS AUTO: 0 /HPF (ref 0–5)

## 2017-05-21 PROCEDURE — 25000003 PHARM REV CODE 250: Performed by: HOSPITALIST

## 2017-05-21 PROCEDURE — 85025 COMPLETE CBC W/AUTO DIFF WBC: CPT

## 2017-05-21 PROCEDURE — 99232 SBSQ HOSP IP/OBS MODERATE 35: CPT | Mod: GC,,, | Performed by: HOSPITALIST

## 2017-05-21 PROCEDURE — 36415 COLL VENOUS BLD VENIPUNCTURE: CPT

## 2017-05-21 PROCEDURE — 80074 ACUTE HEPATITIS PANEL: CPT

## 2017-05-21 PROCEDURE — 80053 COMPREHEN METABOLIC PANEL: CPT

## 2017-05-21 PROCEDURE — 20600001 HC STEP DOWN PRIVATE ROOM

## 2017-05-21 PROCEDURE — 25000003 PHARM REV CODE 250: Performed by: STUDENT IN AN ORGANIZED HEALTH CARE EDUCATION/TRAINING PROGRAM

## 2017-05-21 PROCEDURE — 63600175 PHARM REV CODE 636 W HCPCS: Performed by: INTERNAL MEDICINE

## 2017-05-21 PROCEDURE — 25000003 PHARM REV CODE 250

## 2017-05-21 PROCEDURE — 25000003 PHARM REV CODE 250: Performed by: INTERNAL MEDICINE

## 2017-05-21 RX ORDER — ENOXAPARIN SODIUM 100 MG/ML
40 INJECTION SUBCUTANEOUS EVERY 24 HOURS
Status: DISCONTINUED | OUTPATIENT
Start: 2017-05-21 | End: 2017-05-22

## 2017-05-21 RX ORDER — IBUPROFEN 200 MG
1 TABLET ORAL DAILY
Status: DISCONTINUED | OUTPATIENT
Start: 2017-05-21 | End: 2017-05-29 | Stop reason: HOSPADM

## 2017-05-21 RX ADMIN — THIAMINE HCL TAB 100 MG 100 MG: 100 TAB at 09:05

## 2017-05-21 RX ADMIN — POTASSIUM & SODIUM PHOSPHATES POWDER PACK 280-160-250 MG 1 PACKET: 280-160-250 PACK at 08:05

## 2017-05-21 RX ADMIN — NICOTINE 1 PATCH: 21 PATCH, EXTENDED RELEASE TRANSDERMAL at 09:05

## 2017-05-21 RX ADMIN — LACTULOSE 10 G: 20 SOLUTION ORAL at 08:05

## 2017-05-21 RX ADMIN — ENOXAPARIN SODIUM 40 MG: 100 INJECTION SUBCUTANEOUS at 05:05

## 2017-05-21 RX ADMIN — LACTULOSE 10 G: 20 SOLUTION ORAL at 09:05

## 2017-05-21 NOTE — PLAN OF CARE
Problem: Patient Care Overview  Goal: Plan of Care Review  Outcome: Ongoing (interventions implemented as appropriate)  Pt ambulatory in room and hallway without assist. AC IV accidentally pulled out by patient. Telemetry stickers replaced 6 times this shift, comes off frequently when pt turns in bed or walks. HR in 90s. No pt complaints of pain or nausea.

## 2017-05-21 NOTE — PROGRESS NOTES
Progress Note  Hospital Medicine                                                              Team: [facility] HOSP MED 4    Patient Name: Jimmy Pink  YOB: 1960    Admit Date: 5/20/2017                     LOS: 1    SUBJECTIVE:     Reason for Admission:  Obstructive jaundice      Brief HPI:   Jimmy Pink is a 56 y.o. male with h/o alcohol/tobacco abuse, homeless living at shelter presented as a transfer from [facility] for AES evaluation of obstructive jaundice and a CT from OSH that showed periampullary mass with intra and extrahepatic dilatation, PVT, mild ascites.     Interval history:    NAEON. Pt denies fever chills, nausea, vomiting, SOB, cough, or abdominal pain. Per nursing Pt wanted to smoke last night but was very respectful when told that he could not. He has no complaints or concerns at this time.       OBJECTIVE:     Vital Signs Range (Last 24H):  Temp:  [97.9 °F (36.6 °C)-98.6 °F (37 °C)]   Pulse:  [81-98]   Resp:  [16-18]   BP: (118-147)/(63-72)   SpO2:  [92 %-95 %] Body mass index is 28.74 kg/m².  Wt Readings from Last 1 Encounters:   05/20/17 0458 90.9 kg (200 lb 4.8 oz)       I & O (Last 24H):  Intake/Output Summary (Last 24 hours) at 05/21/17 0656  Last data filed at 05/20/17 1632   Gross per 24 hour   Intake              480 ml   Output                0 ml   Net              480 ml       Physical Exam:  Constitutional: He is oriented to person, place, and time. He appears well-developed and well-nourished. No distress.   HENT:   Eyes: EOM are normal. Pupils are equal, round, and reactive to light. Scleral icterus is present..   Cardiovascular: Normal rate, regular rhythm, normal heart sounds and intact distal pulses.    No murmur heard.  Pulmonary/Chest: Effort normal and breath sounds normal. No respiratory distress. He has no wheezes. He has no rales.   Abdominal: Soft. Bowel sounds are normal. He exhibits distension and ascites. There is no tenderness. There is no No hepatic  flap   Musculoskeletal: Normal range of motion. He exhibits no edema, tenderness or deformity.   Neurological: He is alert and oriented to person, place, and time.   Skin: Skin is warm and dry. He is not diaphoretic. Mild improvement of jaundice   Psychiatric: His behavior is normal.   Thought processing seems slow, but answers questions appropriately and accurately.  Flat affect.     Nursing note and vitals reviewed.    Diagnostic Results:  Lab Results   Component Value Date    WBC 14.71 (H) 05/21/2017    HGB 11.1 (L) 05/21/2017    HCT 31.6 (L) 05/21/2017    MCV 84 05/21/2017     05/21/2017       Recent Labs  Lab 05/21/17  0538   *   *   K 3.5      CO2 25   BUN 14   CREATININE 0.7   CALCIUM 8.6*     Lab Results   Component Value Date    INR 1.1 05/20/2017     No results found for: HGBA1C  No results for input(s): POCTGLUCOSE in the last 72 hours.    ASSESSMENT/PLAN:     Current Problems List:  Active Hospital Problems    Diagnosis  POA    *Obstructive jaundice [K83.8]  Yes    Portal vein thrombosis [I81]  Yes    Alcohol abuse [F10.10]  Yes    Hyperammonemia [E72.20]  Yes    Tobacco abuse [Z72.0]  Yes    Bacteriuria [R82.71]  Yes    Leukocytosis [D72.829]  Yes    Hypophosphatemia [E83.39]  Yes    Hypokalemia [E87.6]  Yes    Transaminitis [R74.0]  Yes    Hypoalbuminemia due to protein-calorie malnutrition [E46]  Yes      Resolved Hospital Problems    Diagnosis Date Resolved POA   No resolved problems to display.     55 yo M with non painful obstructive jaundice     Active Problems, Status & Treatment Plan Addressed Today:    Obstructive jaundice  -OSH labs:  Tbili 21.5, Alk phos 697, ,   -OSH CT abdo from 5/19 concerning for periampullary mass  - Ca 19-9- elevated at 394 and normal AFP   -Hepatitis panel ordered   - AES consulted and recommend CT pancreas, consult to surg onc and plan for EUS on Monday.   - improvement of liver enzymes on labs   - Surg Onc consulted  and will follow up results from EUS on Monday.   - f/u CT pancreas       Portal vein thrombosis  -As observed on CT at OSH on 5/19 (disc in patient's chart)  -Obtain ultrasound liver with doppler for further assessment  - holding anticoagulation for portal vein thrombosis per AES       Hyperammonemia  -Ammonia at OSH 97  -No hepatic flap or encephalopathy on exam  -Lactulose 20 g bid     Alcohol abuse  -Chronic use for decades, 6 beers daily  -No history of withdrawal, no prior hospitalizations due to alcohol-related issues, no known varices  -Last use ~2 weeks ago, though questionable historian  -Monitor for signs of withdrawal  -folate wnl.  Thiamine and vit B12 pending   - banana bag ordered and thiamine 100 mg daily     Hypokalemia:  - on admission 2.7   - repleated with IV and PO   - resolved      Leukocytosis  Bacteriuria  -UA at OSH with bacteria and leukocytosis 16.1  -Asymptomatic  -Repeat UA ordered and urine cultures pending   -1 g IV ceftriaxone     Tobacco abuse  - on cessation   - nicotine patch ordered     Diet: adult regular   Prophylaxis: enoxaparin 40 mg   Plan: Pt NPO after midnight for EUS on Monday. Follow up CT pancreas.     Signing Physician:  Abdiel Murillo MD

## 2017-05-21 NOTE — PROGRESS NOTES
CT ordered yesterday will unlikely be done today per radiology.  Epic downtime last night CT department is backed up with stat orders.  Hopefully will be done overnight.    Arely Bernal, PGY3  [phone]

## 2017-05-21 NOTE — PHARMACY MED REC
Admission Medication Reconciliation - Pharmacy Consult Note    The home medication history was taken by Dora Hsu.  Based on information gathered and subsequent review by the clinical pharmacist, the items below may need attention.      No issues noted with the medication reconciliation.      Juan F Luther, PharmD  PGY-1 Pharmacy Resident  [id number]     Patient's prior to admission medication regimen was as follows:        Please add appropriate    SmartPhrase below:

## 2017-05-22 ENCOUNTER — ANESTHESIA (OUTPATIENT)
Dept: ENDOSCOPY | Facility: HOSPITAL | Age: 57
DRG: 414 | End: 2017-05-22
Payer: MEDICARE

## 2017-05-22 ENCOUNTER — ANESTHESIA EVENT (OUTPATIENT)
Dept: ENDOSCOPY | Facility: HOSPITAL | Age: 57
DRG: 414 | End: 2017-05-22
Payer: MEDICARE

## 2017-05-22 PROBLEM — R17 JAUNDICE: Status: ACTIVE | Noted: 2017-05-22

## 2017-05-22 LAB
ALBUMIN SERPL BCP-MCNC: 2 G/DL
ALP SERPL-CCNC: 559 U/L
ALT SERPL W/O P-5'-P-CCNC: 118 U/L
ANION GAP SERPL CALC-SCNC: 7 MMOL/L
AST SERPL-CCNC: 92 U/L
BASOPHILS # BLD AUTO: 0.04 K/UL
BASOPHILS NFR BLD: 0.3 %
BILIRUB SERPL-MCNC: 7.6 MG/DL
BUN SERPL-MCNC: 12 MG/DL
CALCIUM SERPL-MCNC: 8.1 MG/DL
CHLORIDE SERPL-SCNC: 101 MMOL/L
CO2 SERPL-SCNC: 27 MMOL/L
CREAT SERPL-MCNC: 0.6 MG/DL
DIFFERENTIAL METHOD: ABNORMAL
EOSINOPHIL # BLD AUTO: 0.1 K/UL
EOSINOPHIL NFR BLD: 0.9 %
ERYTHROCYTE [DISTWIDTH] IN BLOOD BY AUTOMATED COUNT: 17.8 %
EST. GFR  (AFRICAN AMERICAN): >60 ML/MIN/1.73 M^2
EST. GFR  (NON AFRICAN AMERICAN): >60 ML/MIN/1.73 M^2
GLUCOSE SERPL-MCNC: 79 MG/DL
HAV IGM SERPL QL IA: NEGATIVE
HAV IGM SERPL QL IA: NEGATIVE
HBV CORE IGM SERPL QL IA: NEGATIVE
HBV CORE IGM SERPL QL IA: NEGATIVE
HBV SURFACE AG SERPL QL IA: NEGATIVE
HBV SURFACE AG SERPL QL IA: NEGATIVE
HCT VFR BLD AUTO: 32.2 %
HCV AB SERPL QL IA: POSITIVE
HCV AB SERPL QL IA: POSITIVE
HCYS SERPL-SCNC: 9.7 UMOL/L
HGB BLD-MCNC: 10.7 G/DL
INR PPP: 1
LYMPHOCYTES # BLD AUTO: 3.1 K/UL
LYMPHOCYTES NFR BLD: 21.6 %
MCH RBC QN AUTO: 28.6 PG
MCHC RBC AUTO-ENTMCNC: 33.2 %
MCV RBC AUTO: 86 FL
MONOCYTES # BLD AUTO: 1.6 K/UL
MONOCYTES NFR BLD: 11.2 %
NEUTROPHILS # BLD AUTO: 9.4 K/UL
NEUTROPHILS NFR BLD: 64.8 %
PLATELET # BLD AUTO: 327 K/UL
PMV BLD AUTO: 10.9 FL
POTASSIUM SERPL-SCNC: 3.4 MMOL/L
PROT SERPL-MCNC: 5.5 G/DL
PROTHROMBIN TIME: 10.9 SEC
RBC # BLD AUTO: 3.74 M/UL
SODIUM SERPL-SCNC: 135 MMOL/L
VIT B12 SERPL-MCNC: 648 NG/L
WBC # BLD AUTO: 14.45 K/UL

## 2017-05-22 PROCEDURE — 25000003 PHARM REV CODE 250: Performed by: HOSPITALIST

## 2017-05-22 PROCEDURE — 85613 RUSSELL VIPER VENOM DILUTED: CPT

## 2017-05-22 PROCEDURE — 85305 CLOT INHIBIT PROT S TOTAL: CPT

## 2017-05-22 PROCEDURE — 85240 CLOT FACTOR VIII AHG 1 STAGE: CPT

## 2017-05-22 PROCEDURE — 0FC98ZZ EXTIRPATION OF MATTER FROM COMMON BILE DUCT, VIA NATURAL OR ARTIFICIAL OPENING ENDOSCOPIC: ICD-10-PCS | Performed by: INTERNAL MEDICINE

## 2017-05-22 PROCEDURE — 25000003 PHARM REV CODE 250: Performed by: ANESTHESIOLOGY

## 2017-05-22 PROCEDURE — 85025 COMPLETE CBC W/AUTO DIFF WBC: CPT

## 2017-05-22 PROCEDURE — 25000003 PHARM REV CODE 250: Performed by: NURSE ANESTHETIST, CERTIFIED REGISTERED

## 2017-05-22 PROCEDURE — 86147 CARDIOLIPIN ANTIBODY EA IG: CPT | Mod: 59

## 2017-05-22 PROCEDURE — 43262 ENDO CHOLANGIOPANCREATOGRAPH: CPT | Performed by: INTERNAL MEDICINE

## 2017-05-22 PROCEDURE — 63600175 PHARM REV CODE 636 W HCPCS: Performed by: NURSE ANESTHETIST, CERTIFIED REGISTERED

## 2017-05-22 PROCEDURE — 27000221 HC OXYGEN, UP TO 24 HOURS

## 2017-05-22 PROCEDURE — 0DB58ZX EXCISION OF ESOPHAGUS, VIA NATURAL OR ARTIFICIAL OPENING ENDOSCOPIC, DIAGNOSTIC: ICD-10-PCS | Performed by: INTERNAL MEDICINE

## 2017-05-22 PROCEDURE — D9220A PRA ANESTHESIA: Mod: CRNA,,, | Performed by: NURSE ANESTHETIST, CERTIFIED REGISTERED

## 2017-05-22 PROCEDURE — 74328 X-RAY BILE DUCT ENDOSCOPY: CPT | Performed by: INTERNAL MEDICINE

## 2017-05-22 PROCEDURE — 43264 ERCP REMOVE DUCT CALCULI: CPT | Mod: ,,, | Performed by: INTERNAL MEDICINE

## 2017-05-22 PROCEDURE — 37000009 HC ANESTHESIA EA ADD 15 MINS: Performed by: INTERNAL MEDICINE

## 2017-05-22 PROCEDURE — 81240 F2 GENE: CPT

## 2017-05-22 PROCEDURE — 86146 BETA-2 GLYCOPROTEIN ANTIBODY: CPT

## 2017-05-22 PROCEDURE — 83021 HEMOGLOBIN CHROMOTOGRAPHY: CPT

## 2017-05-22 PROCEDURE — 27200999 HC RETRIEVAL BALLOON, ERCP: Performed by: INTERNAL MEDICINE

## 2017-05-22 PROCEDURE — 20600001 HC STEP DOWN PRIVATE ROOM

## 2017-05-22 PROCEDURE — 88305 TISSUE EXAM BY PATHOLOGIST: CPT | Performed by: PATHOLOGY

## 2017-05-22 PROCEDURE — 0DJ08ZZ INSPECTION OF UPPER INTESTINAL TRACT, VIA NATURAL OR ARTIFICIAL OPENING ENDOSCOPIC: ICD-10-PCS | Performed by: INTERNAL MEDICINE

## 2017-05-22 PROCEDURE — 43259 EGD US EXAM DUODENUM/JEJUNUM: CPT | Mod: 51,,, | Performed by: INTERNAL MEDICINE

## 2017-05-22 PROCEDURE — D9220A PRA ANESTHESIA: Mod: ANES,,, | Performed by: ANESTHESIOLOGY

## 2017-05-22 PROCEDURE — 74328 X-RAY BILE DUCT ENDOSCOPY: CPT | Mod: 26,,, | Performed by: INTERNAL MEDICINE

## 2017-05-22 PROCEDURE — C1769 GUIDE WIRE: HCPCS | Performed by: INTERNAL MEDICINE

## 2017-05-22 PROCEDURE — 85610 PROTHROMBIN TIME: CPT

## 2017-05-22 PROCEDURE — 80053 COMPREHEN METABOLIC PANEL: CPT

## 2017-05-22 PROCEDURE — 43264 ERCP REMOVE DUCT CALCULI: CPT | Performed by: INTERNAL MEDICINE

## 2017-05-22 PROCEDURE — 43262 ENDO CHOLANGIOPANCREATOGRAPH: CPT | Mod: ,,, | Performed by: INTERNAL MEDICINE

## 2017-05-22 PROCEDURE — 25000003 PHARM REV CODE 250: Performed by: INTERNAL MEDICINE

## 2017-05-22 PROCEDURE — 36415 COLL VENOUS BLD VENIPUNCTURE: CPT

## 2017-05-22 PROCEDURE — 83020 HEMOGLOBIN ELECTROPHORESIS: CPT

## 2017-05-22 PROCEDURE — 81241 F5 GENE: CPT

## 2017-05-22 PROCEDURE — 27200949 HC CANNULATOME: Performed by: INTERNAL MEDICINE

## 2017-05-22 PROCEDURE — 99232 SBSQ HOSP IP/OBS MODERATE 35: CPT | Mod: GC,,, | Performed by: HOSPITALIST

## 2017-05-22 PROCEDURE — 85300 ANTITHROMBIN III ACTIVITY: CPT

## 2017-05-22 PROCEDURE — 83090 ASSAY OF HOMOCYSTEINE: CPT

## 2017-05-22 PROCEDURE — 43259 EGD US EXAM DUODENUM/JEJUNUM: CPT | Performed by: INTERNAL MEDICINE

## 2017-05-22 PROCEDURE — 27201674 HC SPHINCTERTOME: Performed by: INTERNAL MEDICINE

## 2017-05-22 PROCEDURE — 83695 ASSAY OF LIPOPROTEIN(A): CPT

## 2017-05-22 PROCEDURE — 25500020 PHARM REV CODE 255: Performed by: HOSPITALIST

## 2017-05-22 PROCEDURE — 25000003 PHARM REV CODE 250: Performed by: STUDENT IN AN ORGANIZED HEALTH CARE EDUCATION/TRAINING PROGRAM

## 2017-05-22 PROCEDURE — 85303 CLOT INHIBIT PROT C ACTIVITY: CPT

## 2017-05-22 PROCEDURE — 63600175 PHARM REV CODE 636 W HCPCS: Performed by: INTERNAL MEDICINE

## 2017-05-22 PROCEDURE — 37000008 HC ANESTHESIA 1ST 15 MINUTES: Performed by: INTERNAL MEDICINE

## 2017-05-22 PROCEDURE — 88305 TISSUE EXAM BY PATHOLOGIST: CPT | Mod: 26,,, | Performed by: PATHOLOGY

## 2017-05-22 RX ORDER — SODIUM CHLORIDE 9 MG/ML
INJECTION, SOLUTION INTRAVENOUS CONTINUOUS
Status: DISCONTINUED | OUTPATIENT
Start: 2017-05-22 | End: 2017-05-23

## 2017-05-22 RX ORDER — SUCCINYLCHOLINE CHLORIDE 20 MG/ML
INJECTION INTRAMUSCULAR; INTRAVENOUS
Status: DISCONTINUED | OUTPATIENT
Start: 2017-05-22 | End: 2017-05-22

## 2017-05-22 RX ORDER — FENTANYL CITRATE 50 UG/ML
INJECTION, SOLUTION INTRAMUSCULAR; INTRAVENOUS
Status: DISCONTINUED | OUTPATIENT
Start: 2017-05-22 | End: 2017-05-22

## 2017-05-22 RX ORDER — ONDANSETRON 2 MG/ML
INJECTION INTRAMUSCULAR; INTRAVENOUS
Status: DISCONTINUED | OUTPATIENT
Start: 2017-05-22 | End: 2017-05-22

## 2017-05-22 RX ORDER — SODIUM CHLORIDE 0.9 % (FLUSH) 0.9 %
3 SYRINGE (ML) INJECTION
Status: DISCONTINUED | OUTPATIENT
Start: 2017-05-22 | End: 2017-05-22 | Stop reason: HOSPADM

## 2017-05-22 RX ORDER — LIDOCAINE HCL/PF 100 MG/5ML
SYRINGE (ML) INTRAVENOUS
Status: DISCONTINUED | OUTPATIENT
Start: 2017-05-22 | End: 2017-05-22

## 2017-05-22 RX ORDER — POTASSIUM CHLORIDE 750 MG/1
50 CAPSULE, EXTENDED RELEASE ORAL ONCE
Status: COMPLETED | OUTPATIENT
Start: 2017-05-22 | End: 2017-05-22

## 2017-05-22 RX ORDER — SODIUM CHLORIDE 9 MG/ML
INJECTION, SOLUTION INTRAVENOUS CONTINUOUS PRN
Status: DISCONTINUED | OUTPATIENT
Start: 2017-05-22 | End: 2017-05-22

## 2017-05-22 RX ORDER — PROPOFOL 10 MG/ML
VIAL (ML) INTRAVENOUS
Status: DISCONTINUED | OUTPATIENT
Start: 2017-05-22 | End: 2017-05-22

## 2017-05-22 RX ORDER — MIDAZOLAM HYDROCHLORIDE 1 MG/ML
INJECTION, SOLUTION INTRAMUSCULAR; INTRAVENOUS
Status: DISCONTINUED | OUTPATIENT
Start: 2017-05-22 | End: 2017-05-22

## 2017-05-22 RX ORDER — ONDANSETRON 2 MG/ML
4 INJECTION INTRAMUSCULAR; INTRAVENOUS DAILY PRN
Status: DISCONTINUED | OUTPATIENT
Start: 2017-05-22 | End: 2017-05-22 | Stop reason: HOSPADM

## 2017-05-22 RX ORDER — ROCURONIUM BROMIDE 10 MG/ML
INJECTION, SOLUTION INTRAVENOUS
Status: DISCONTINUED | OUTPATIENT
Start: 2017-05-22 | End: 2017-05-22

## 2017-05-22 RX ORDER — FENTANYL CITRATE 50 UG/ML
25 INJECTION, SOLUTION INTRAMUSCULAR; INTRAVENOUS EVERY 5 MIN PRN
Status: DISCONTINUED | OUTPATIENT
Start: 2017-05-22 | End: 2017-05-22 | Stop reason: HOSPADM

## 2017-05-22 RX ADMIN — IOHEXOL 100 ML: 350 INJECTION, SOLUTION INTRAVENOUS at 06:05

## 2017-05-22 RX ADMIN — LACTULOSE 10 G: 20 SOLUTION ORAL at 10:05

## 2017-05-22 RX ADMIN — POTASSIUM CHLORIDE 50 MEQ: 750 CAPSULE, EXTENDED RELEASE ORAL at 09:05

## 2017-05-22 RX ADMIN — FENTANYL CITRATE 50 MCG: 50 INJECTION, SOLUTION INTRAMUSCULAR; INTRAVENOUS at 02:05

## 2017-05-22 RX ADMIN — FENTANYL CITRATE 100 MCG: 50 INJECTION, SOLUTION INTRAMUSCULAR; INTRAVENOUS at 01:05

## 2017-05-22 RX ADMIN — LACTULOSE 10 G: 20 SOLUTION ORAL at 09:05

## 2017-05-22 RX ADMIN — LIDOCAINE HYDROCHLORIDE 50 MG: 20 INJECTION, SOLUTION INTRAVENOUS at 01:05

## 2017-05-22 RX ADMIN — SUCCINYLCHOLINE CHLORIDE 120 MG: 20 INJECTION, SOLUTION INTRAMUSCULAR; INTRAVENOUS at 01:05

## 2017-05-22 RX ADMIN — CEFTRIAXONE 1 G: 1 INJECTION, SOLUTION INTRAVENOUS at 09:05

## 2017-05-22 RX ADMIN — MIDAZOLAM HYDROCHLORIDE 2 MG: 1 INJECTION, SOLUTION INTRAMUSCULAR; INTRAVENOUS at 01:05

## 2017-05-22 RX ADMIN — ONDANSETRON 4 MG: 2 INJECTION INTRAMUSCULAR; INTRAVENOUS at 02:05

## 2017-05-22 RX ADMIN — SODIUM CHLORIDE: 0.9 INJECTION, SOLUTION INTRAVENOUS at 01:05

## 2017-05-22 RX ADMIN — NICOTINE 1 PATCH: 21 PATCH, EXTENDED RELEASE TRANSDERMAL at 09:05

## 2017-05-22 RX ADMIN — ROCURONIUM BROMIDE 5 MG: 10 INJECTION, SOLUTION INTRAVENOUS at 01:05

## 2017-05-22 RX ADMIN — SODIUM CHLORIDE: 0.9 INJECTION, SOLUTION INTRAVENOUS at 03:05

## 2017-05-22 RX ADMIN — PROPOFOL 100 MG: 10 INJECTION, EMULSION INTRAVENOUS at 01:05

## 2017-05-22 NOTE — H&P
History & Physical - Short Stay  Gastroenterology      SUBJECTIVE:     Procedure: EUS and ERCP    Chief Complaint/Indication for Procedure: jaundice    History of Present Illness:  Patient is a 56 y.o. male presents with jaundice and dilated bile ducts. His liver numbers are improving. CT with ? Ampullary lesion.     No prescriptions prior to admission.       Review of patient's allergies indicates:  No Known Allergies     Past Medical History:   Diagnosis Date    Alcohol abuse     History of fracture of finger     Tobacco abuse      History reviewed. No pertinent surgical history.  Family History   Problem Relation Age of Onset    Heart disease Father     No Known Problems Sister      Social History   Substance Use Topics    Smoking status: Current Every Day Smoker     Packs/day: 0.50     Years: 20.00     Types: Cigarettes     Start date: 5/20/1997    Smokeless tobacco: Not on file    Alcohol use 1.8 oz/week     3 Cans of beer per week       Review of Systems:  Constitutional: no fever or chills  Gastrointestinal: no nausea or vomiting, no abdominal pain or change in bowel habits    OBJECTIVE:     Vital Signs (Most Recent)  Temp: 97.5 °F (36.4 °C) (05/22/17 1217)  Pulse: 86 (05/22/17 1217)  Resp: 18 (05/22/17 1217)  BP: 126/76 (05/22/17 1217)  SpO2: 99 % (05/22/17 1217)    Physical Exam:  General: well developed, well nourished  Abdomen: soft, non-tender non-distented; bowel sounds normal; no masses,  no organomegaly         ASSESSMENT/PLAN:     Patient is a 56 y.o. male presents with jaundice and dilated bile ducts. His liver numbers are improving. CT with ? Ampullary lesion.     Plan: EUS and ERCP    Anesthesia Plan: General    ASA Grade: ASA 2 - Patient with mild systemic disease with no functional limitations

## 2017-05-22 NOTE — PROGRESS NOTES
Patient found to have choledocholithiasis and cholelithiasis.  Recommend general surgery consultation for cholecystectomy.  Furthermore unclear etiology of PVT without cirrhosis.  Recommend hypercoagulability panel.  Patient will need lovenox therapy (length determined by hypercoaguability panel and discussion with GI); hold off on initiating until discussion of surgical plans.

## 2017-05-22 NOTE — PATIENT INSTRUCTIONS
Discharge Summary/Instructions for after an ERCP  Patient Name: Jimmy Pink  Patient MRN: [id number]  Patient YOB: 1960  Monday, May 22, 2017  Jennifer Owen MD  1.  Do Not eat or drink anything for 1 hour.  Try sips of water first.  If   tolerated, resume your regular diet or one recommended by your physician.  2.  Do not drive, operate machinery, make critical decisions, or do   activities that require coordination or balance for 24 hours.  3.  You may experience a sore throat for 24 to 48 hours.  You may use throat   lozenges or gargle with warm salt water to relieve the discomfort.  4.  Because air was put into your stomach during the procedure, you may   experience some belching.  5.  Go directly to the emergency room if you notice any of the following:   Chills and/or fever over 101   Persistent vomiting or vomiting with blood   Severe abdominal pain, other than gas cramps   Severe chest pain   Black, tarry stools  Your doctor recommends these additional instructions:  Resume your previous diet.   Continue your present medications.  If you have any questions or problems, please call your physician.  EMERGENCY PHONE NUMBER: [phone]  LAB RESULTS: [phone]  Jennifer Owen MD  5/22/2017 3:29:34 PM  This report has been verified and signed electronically.

## 2017-05-22 NOTE — ANESTHESIA POSTPROCEDURE EVALUATION
"Anesthesia Post Evaluation    Patient: Jimmy Pink    Procedure(s) Performed: Procedure(s) (LRB):  ULTRASOUND-ENDOSCOPIC-UPPER (N/A)  ERCP (N/A)    Final Anesthesia Type: general  Patient location during evaluation: PACU  Patient participation: Yes- Able to Participate  Level of consciousness: awake and alert  Post-procedure vital signs: reviewed and stable  Pain management: adequate  Airway patency: patent  PONV status at discharge: No PONV  Anesthetic complications: no      Cardiovascular status: blood pressure returned to baseline  Respiratory status: spontaneous ventilation  Hydration status: euvolemic  Follow-up not needed.        Visit Vitals  /70   Pulse 87   Temp 36.7 °C (98.1 °F) (Oral)   Resp 19   Ht 5' 10" (1.778 m)   Wt 90.9 kg (200 lb 6.4 oz)   SpO2 98%   BMI 28.75 kg/m²       Pain/Merary Score: Pain Assessment Performed: Yes (5/22/2017  4:00 PM)  Presence of Pain: denies (5/22/2017  4:00 PM)  Merary Score: 10 (5/22/2017  4:00 PM)      "

## 2017-05-22 NOTE — ANESTHESIA PREPROCEDURE EVALUATION
05/22/2017  Jimmy Pink is a 56 y.o., male.    Anesthesia Evaluation    I have reviewed the Patient Summary Reports.     I have reviewed the Medications.     Review of Systems  Anesthesia Hx:  History of prior surgery of interest to airway management or planning:  Denies Personal Hx of Anesthesia complications.   Social:  Smoker, Alcohol Use    Hepatic/GI:   Liver Disease,        Physical Exam  General:  Obesity    Airway/Jaw/Neck:  Airway Findings: Mouth Opening: Normal Tongue: Normal  General Airway Assessment: Adult  Mallampati: III  Improves to III with phonation.  TM Distance: 4 - 6 cm  Jaw/Neck Findings:  Neck ROM: Normal ROM      Dental:  Dental Findings:    Chest/Lungs:  Chest/Lungs Findings: Normal Respiratory Rate     Heart/Vascular:  Heart Findings: Rhythm: Regular Rhythm  Heart Murmur        Mental Status:  Mental Status Findings:  Alert and Oriented         Anesthesia Plan  Type of Anesthesia, risks & benefits discussed:  Anesthesia Type:  general  Patient's Preference: General, likely without ETT  Intra-op Monitoring Plan: standard ASA monitors  Intra-op Monitoring Plan Comments:   Post Op Pain Control Plan:   Post Op Pain Control Plan Comments:   Induction:   IV  Beta Blocker:  Patient is not currently on a Beta-Blocker (No further documentation required).       Informed Consent: Patient understands risks and agrees with Anesthesia plan.  Questions answered. Anesthesia consent signed with patient.  ASA Score: 3     Day of Surgery Review of History & Physical:    H&P update referred to the surgeon.     Anesthesia Plan Notes: NPO confirmed.   No history of anesthesia problems.         Ready For Surgery From Anesthesia Perspective.

## 2017-05-22 NOTE — CONSULTS
Consult acknowledged for choledocholithiasis. Attempted to see patient but was in endoscopy. Formal Consult note to follow.

## 2017-05-22 NOTE — PATIENT INSTRUCTIONS
Discharge Summary/Instructions for after an Upper EUS  Patient Name: Jimmy Pink  Patient MRN: [id number]  Patient YOB: 1960  Monday, May 22, 2017  Jennifer Owen MD  1.  Do Not eat or drink anything for 1 hour.  Try sips of water first.  If   tolerated, resume your regular diet or one recommended by your physician.  2.  Do not drive, operate machinery, make critical decisions, or do   activities that require coordination or balance for 24 hours.  3.  You may experience a sore throat for 24 to 48 hours.  You may use throat   lozenges or gargle with warm salt water to relieve the discomfort.  4.  Because air was put into your stomach during the procedure, you may   experience some belching.  5.  Go directly to the emergency room if you notice any of the following:   Chills and/or fever over 101   Persistent vomiting or vomiting with blood   Severe abdominal pain, other than gas cramps   Severe chest pain   Black, tarry stools  Your doctor recommends these additional instructions:  Resume your previous diet.   Continue your present medications.  If you have any questions on the above instructions, call the GI Lab and ask   for an endoscopy nurse.  If you have any problems, please call your physician.  EMERGENCY PHONE NUMBER: [phone]  LAB RESULTS: [phone]  Jennifer Owen MD  5/22/2017 3:14:37 PM  This report has been verified and signed electronically.

## 2017-05-22 NOTE — ANESTHESIA RELEASE NOTE
Anesthesia Release from PACU note     Patient: Jimmy Pink  Procedure(s) Performed: Procedure(s) (LRB):  ULTRASOUND-ENDOSCOPIC-UPPER (N/A)  ERCP (N/A)  Anesthesia type: general  Post pain: Adequate analgesi  Post assessment: no apparent anesthetic complications, tolerated procedure well and no evidence of recall  Last Vitals:   Vitals:    05/22/17 1615   BP: 132/70   Pulse: 87   Resp: 19   Temp:    SpO2: 98%     Post vital signs: stable  Level of consciousness: awake, alert  and oriented  Nausea/Vomiting: no nausea/no vomiting  Complications: none  Airway Patency: patent  Respiratory: unassisted  Cardiovascular: stable and blood pressure at baseline  Hydration: euvolemic

## 2017-05-22 NOTE — DISCHARGE INSTRUCTIONS
Endoscopic Ultrasound (EUS)    An endoscopic ultrasound (EUS) is a test to look at the inside of your gastrointestinal (GI) tract. It's commonly used to look for cancers or growths in the esophagus, stomach, pancreas, liver, and rectum. It can help to stage cancer (see how advanced a cancer is). EUS may also be used to help diagnose certain diseases or to drain cysts or abscesses.  What is EUS?  EUS shows both ultrasound images and live video of the GI tract. During the test, a flexible tube called an endoscope (scope) is used. At the end of the scope is a tiny video camera and light. The video camera sends live images to a monitor. The scope also contains a very small ultrasound device. This uses sound waves to create images and send them to a monitor.  A needle is passed through the scope. The needle can be used take a small sample of tissue for testing. This is called a biopsy. The needle can be used to take a sample of fluid. This is called fine-needle aspiration (FNA).  Risks and possible complications of EUS  Risks and possible complications include the following:  · Bleeding  · Infection  · A perforation (hole) in the digestive tract   · Risks of sedation or anesthesia   Before the test  Be prepared prior to the test:  · Tell your healthcare provider what medicine you take. This includes vitamins, herbs, and over-the-counter medicine. It also includes any blood thinners, such as warfarin, clopidogrel, ibuprofen, or daily aspirin. Ask your healthcare provider if you need to stop taking some or all of them before the test.  · You may be prescribed antibiotics to take before or after the test. This depends on the area being studied and what is done during the test. These medicines help prevent infection.  · Carefully follow the instructions for preparing for the test to make sure results are accurate. Instructions may include:  ¨ If youre having an EUS of the upper GI tract (esophagus, stomach, duodenum,  pancreas, liver):  § Do not eat or drink for 6 hours before the test.  ¨ If youre having an EUS of the lower GI tract (rectum):  § Before the test, do bowel prep as instructed to clean your rectum of stool. This may involve a clear liquid diet and using a laxative (liquid or pills) the night before the test. Or it may mean doing one or more enemas the morning of the test.  § Do not eat or drink for 6 hours before the test.  · Be sure to arrive on time at the facility. Bring your identification and health insurance card. Leave valuables at home. If you have them, bring X-rays or other test results with you.  Let the healthcare provider know  For your safety, tell the healthcare provider if you:  · Take insulin. Your dose may need to be changed on the day of your test.  · Are allergic to latex.  · Have any other allergies.  · Are taking blood thinners.   During the test  An endoscopic ultrasound usually takes place in a hospital. The procedure itself may take 1 to 2 hours. You will likely go home soon afterward. During the test:  · You lie on your left side on an exam table.  · An intravenous (IV) line will be put into a vein in your arm or hand. This line supplies fluids and medicines. To keep you comfortable during the test, you will be given a sedative medicine. This medicine prevents discomfort and will make you sleepy.  · If you are having an EUS of the upper GI tract, local anesthetic may be sprayed in your throat. This will help you be more comfortable as the healthcare provider inserts the scope. The healthcare provider then gently puts the flexible scope into your mouth or nose and down your throat.  · If youre having an EUS of the lower GI tract, the healthcare provider gently puts the flexible scope into your anus.  · During the test, the scope sends live video and ultrasound images from inside your body to nearby monitors. These are used to examine your GI tract. Specialized procedures, such as drainage,  are done as needed.  · The healthcare provider may discuss the results with you soon after the test. Biopsy results take several  days.  · In most cases, you can go home within a few hours of the test. When you leave the facility, have an adult family member or friend drive you, even if you don't feel that sleepy.  After the test  Here is what to expect after the test:  · You may feel tired from the sedative. This should wear off by the end of the day.  · If you had an upper digestive endoscopy, your throat may feel sore for a day or two. Over-the-counter sore throat lozenges and spray should help.  · You can eat and drink normally as soon as the test is done.  When to call the healthcare provider  Call your healthcare provider if you notice any of the following:  · Fever of 100.4°F (38.0°C) or higher, or as advised by your healthcare provider  · Shortness of breath  · Vomiting blood, blood in stool, or black stools  · Coughing or hoarse voice that wont go away   Date Last Reviewed: 7/1/2016  © 1246-0655 [employer]. [address] [place] [address]. All rights reserved. This information is not intended as a substitute for professional medical care. Always follow your healthcare professional's instructions.

## 2017-05-22 NOTE — PROGRESS NOTES
Progress Note  Hospital Medicine                                                              Team: [facility]OSP MED 4    Patient Name: Jimmy Pink  YOB: 1960    Admit Date: 5/20/2017                     LOS: 2    SUBJECTIVE:     Reason for Admission:  Obstructive jaundice      Brief HPI:   Jimmy Pink is a 56 y.o. male with h/o alcohol/tobacco abuse, homeless living at shelter presented as a transfer from [facility] for AES evaluation of obstructive jaundice and a CT from OSH that showed periampullary mass with intra and extrahepatic dilatation, PVT, mild ascites.     Interval history:    NAEON. Pt denies fever chills, nausea, vomiting, SOB, cough, or abdominal pain.        OBJECTIVE:     Vital Signs Range (Last 24H):  Temp:  [98 °F (36.7 °C)-98.5 °F (36.9 °C)]   Pulse:  [90-94]   Resp:  [18]   BP: (110-135)/(68-95)   SpO2:  [93 %-97 %] Body mass index is 28.74 kg/m².  Wt Readings from Last 1 Encounters:   05/20/17 0458 90.9 kg (200 lb 4.8 oz)       I & O (Last 24H):No intake or output data in the 24 hours ending 05/22/17 0714    Physical Exam:  Constitutional: He is oriented to person, place, and time. He appears well-developed and well-nourished. No distress.   HENT:   Eyes: EOM are normal. Pupils are equal, round, and reactive to light. Scleral icterus is present..   Cardiovascular: Normal rate, regular rhythm, normal heart sounds and intact distal pulses.    No murmur heard.  Pulmonary/Chest: Effort normal and breath sounds normal. No respiratory distress. He has no wheezes. He has no rales.   Abdominal: Soft. Bowel sounds are normal. There is no tenderness. There is no No hepatic flap   Musculoskeletal: Normal range of motion. He exhibits no edema, tenderness or deformity.   Neurological: He is alert and oriented to person, place, and time.   Skin: Skin is warm and dry. He is not diaphoretic. Mild improvement of jaundice   Psychiatric: His behavior is normal.   Thought processing seems  slow, but answers questions appropriately and accurately.  Flat affect.     Nursing note and vitals reviewed.    Diagnostic Results:  Lab Results   Component Value Date    WBC 14.71 (H) 05/21/2017    HGB 11.1 (L) 05/21/2017    HCT 31.6 (L) 05/21/2017    MCV 84 05/21/2017     05/21/2017       Recent Labs  Lab 05/22/17  0525   GLU 79   *   K 3.4*      CO2 27   BUN 12   CREATININE 0.6   CALCIUM 8.1*     Lab Results   Component Value Date    INR 1.1 05/20/2017     No results found for: HGBA1C  No results for input(s): POCTGLUCOSE in the last 72 hours.    ASSESSMENT/PLAN:     Current Problems List:  Active Hospital Problems    Diagnosis  POA    *Obstructive jaundice [K83.8]  Yes    Portal vein thrombosis [I81]  Yes    Alcohol abuse [F10.10]  Yes    Hyperammonemia [E72.20]  Yes    Tobacco abuse [Z72.0]  Yes    Bacteriuria [R82.71]  Yes    Leukocytosis [D72.829]  Yes    Hypophosphatemia [E83.39]  Yes    Hypokalemia [E87.6]  Yes    Transaminitis [R74.0]  Yes    Hypoalbuminemia due to protein-calorie malnutrition [E46]  Yes      Resolved Hospital Problems    Diagnosis Date Resolved POA   No resolved problems to display.     57 yo M with non painful obstructive jaundice     Active Problems, Status & Treatment Plan Addressed Today:    Obstructive jaundice  -OSH labs:  Tbili 21.5, Alk phos 697, ,   -OSH CT abdo from 5/19 concerning for periampullary mass  - Ca 19-9- elevated at 394 and normal AFP   -Hepatitis panel ordered   - AES consulted and recommend CT pancreas, consult to surg onc and plan for EUS on Monday.   - improvement of liver enzymes on labs   - Surg Onc consulted and will follow up results from EUS on Monday.   - CT pancreas negative for mass or stone, positive for bile duct dilatation      Portal vein thrombosis  -As observed on CT at OSH on 5/19 (disc in patient's chart)  -Obtain ultrasound liver with doppler for further assessment  - holding anticoagulation for portal  vein thrombosis per AES      UTI  - received one dose of ceftriaxone at OSH  - urine culture growing GNR, ID pending  - will continue ceftriaxone daily     Hyperammonemia  -Ammonia at OSH 97  -No hepatic flap or encephalopathy on exam  -Lactulose 20 g bid     Alcohol abuse  -Chronic use for decades, 6 beers daily  -No history of withdrawal, no prior hospitalizations due to alcohol-related issues, no known varices  -Last use ~2 weeks ago, though questionable historian  -Monitor for signs of withdrawal  -folate wnl.  Thiamine and vit B12 pending   - banana bag ordered and thiamine 100 mg daily     Tobacco abuse  - on cessation   - nicotine patch ordered     Diet: adult regular   Prophylaxis: enoxaparin 40 mg - held for ERCP      Signing Physician:  Arely Bernal MD

## 2017-05-22 NOTE — TRANSFER OF CARE
"Anesthesia Transfer of Care Note    Patient: Jimmy Pink    Procedure(s) Performed: Procedure(s) (LRB):  ULTRASOUND-ENDOSCOPIC-UPPER (N/A)  ERCP (N/A)    Patient location: PACU    Anesthesia Type: general    Transport from OR: Transported from OR on 6-10 L/min O2 by face mask with adequate spontaneous ventilation    Post pain: adequate analgesia    Post assessment: no apparent anesthetic complications and tolerated procedure well    Post vital signs: stable    Level of consciousness: sedated and responds to stimulation    Nausea/Vomiting: no nausea/vomiting    Complications: none    Transfer of care protocol was followed      Last vitals:   Visit Vitals  /72 (BP Location: Left arm, Patient Position: Lying, BP Method: Automatic)   Pulse 90   Temp 36.4 °C (97.5 °F) (Oral)   Resp 20   Ht 5' 10" (1.778 m)   Wt 90.9 kg (200 lb 6.4 oz)   SpO2 100%   BMI 28.75 kg/m²     "

## 2017-05-22 NOTE — PLAN OF CARE
Problem: Patient Care Overview  Goal: Plan of Care Review  Outcome: Ongoing (interventions implemented as appropriate)  Pt had ERCP today tolerated procedure well.  Remained free from falls this shift

## 2017-05-22 NOTE — NURSING
Tolerated NPO and antibiotic therapy.  Remained free from falls this shift.  Tolerated ERCP procedure well.  Denies pain, denies N-V

## 2017-05-22 NOTE — PROGRESS NOTES
Spoke with CT, stated that pt can likely go down for scan within the next hour. Pt drinking cup of water at this time, per CT and now has a 20 g IV to right forearm.

## 2017-05-22 NOTE — PROGRESS NOTES
Pt went to and from CT scan via wheelchair with floor nurse. Pt returned to room, no distress noted.

## 2017-05-22 NOTE — NURSING TRANSFER
Nursing Transfer Note      5/22/2017     Transfer to 910    Transfer via stretcher    Transfer with iv fluids    Transported by pct    Medicines sent: iv fluids, rocephin with chart    Chart send with patient:yes    Notified: RN    Patient reassessed at: 5/22/17

## 2017-05-23 ENCOUNTER — ANESTHESIA (OUTPATIENT)
Dept: SURGERY | Facility: HOSPITAL | Age: 57
DRG: 414 | End: 2017-05-23
Payer: MEDICARE

## 2017-05-23 ENCOUNTER — ANESTHESIA EVENT (OUTPATIENT)
Dept: SURGERY | Facility: HOSPITAL | Age: 57
DRG: 414 | End: 2017-05-23
Payer: MEDICARE

## 2017-05-23 PROBLEM — K80.20 BILIARY CALCULUS: Status: ACTIVE | Noted: 2017-05-23

## 2017-05-23 PROBLEM — B19.20 HEPATITIS C VIRUS INFECTION WITHOUT HEPATIC COMA: Status: ACTIVE | Noted: 2017-05-23

## 2017-05-23 PROBLEM — K80.51 CHOLEDOCHOLITHIASIS WITH OBSTRUCTION: Status: ACTIVE | Noted: 2017-05-23

## 2017-05-23 LAB
ABO + RH BLD: NORMAL
ALBUMIN SERPL BCP-MCNC: 2.2 G/DL
ALBUMIN SERPL BCP-MCNC: 2.2 G/DL
ALP SERPL-CCNC: 514 U/L
ALP SERPL-CCNC: 554 U/L
ALT SERPL W/O P-5'-P-CCNC: 109 U/L
ALT SERPL W/O P-5'-P-CCNC: 120 U/L
AMMONIA PLAS-SCNC: 44 UMOL/L
ANION GAP SERPL CALC-SCNC: 10 MMOL/L
ANION GAP SERPL CALC-SCNC: 10 MMOL/L
ANISOCYTOSIS BLD QL SMEAR: SLIGHT
APTT BLDCRRT: 22.3 SEC
AST SERPL-CCNC: 133 U/L
AST SERPL-CCNC: 80 U/L
BASOPHILS # BLD AUTO: 0.03 K/UL
BASOPHILS # BLD AUTO: 0.05 K/UL
BASOPHILS # BLD AUTO: ABNORMAL K/UL
BASOPHILS NFR BLD: 0 %
BASOPHILS NFR BLD: 0.2 %
BASOPHILS NFR BLD: 0.3 %
BILIRUB SERPL-MCNC: 6.7 MG/DL
BILIRUB SERPL-MCNC: 7.2 MG/DL
BLD GP AB SCN CELLS X3 SERPL QL: NORMAL
BUN SERPL-MCNC: 12 MG/DL
BUN SERPL-MCNC: 13 MG/DL
CALCIUM SERPL-MCNC: 8.1 MG/DL
CALCIUM SERPL-MCNC: 8.3 MG/DL
CARDIOLIPIN IGG SER IA-ACNC: <9.4 GPL
CARDIOLIPIN IGM SER IA-ACNC: 20.37 MPL
CHLORIDE SERPL-SCNC: 101 MMOL/L
CHLORIDE SERPL-SCNC: 101 MMOL/L
CO2 SERPL-SCNC: 22 MMOL/L
CO2 SERPL-SCNC: 23 MMOL/L
CREAT SERPL-MCNC: 0.6 MG/DL
CREAT SERPL-MCNC: 0.7 MG/DL
DIFFERENTIAL METHOD: ABNORMAL
EOSINOPHIL # BLD AUTO: 0.1 K/UL
EOSINOPHIL # BLD AUTO: 0.1 K/UL
EOSINOPHIL # BLD AUTO: ABNORMAL K/UL
EOSINOPHIL NFR BLD: 0 %
EOSINOPHIL NFR BLD: 0.5 %
EOSINOPHIL NFR BLD: 0.7 %
ERYTHROCYTE [DISTWIDTH] IN BLOOD BY AUTOMATED COUNT: 17.3 %
ERYTHROCYTE [DISTWIDTH] IN BLOOD BY AUTOMATED COUNT: 17.5 %
ERYTHROCYTE [DISTWIDTH] IN BLOOD BY AUTOMATED COUNT: 17.8 %
EST. GFR  (AFRICAN AMERICAN): >60 ML/MIN/1.73 M^2
EST. GFR  (AFRICAN AMERICAN): >60 ML/MIN/1.73 M^2
EST. GFR  (NON AFRICAN AMERICAN): >60 ML/MIN/1.73 M^2
EST. GFR  (NON AFRICAN AMERICAN): >60 ML/MIN/1.73 M^2
FACT VIII ACT/NOR PPP: 238 %
GIANT PLATELETS BLD QL SMEAR: PRESENT
GLUCOSE SERPL-MCNC: 112 MG/DL
GLUCOSE SERPL-MCNC: 76 MG/DL
HCT VFR BLD AUTO: 32.2 %
HCT VFR BLD AUTO: 32.2 %
HCT VFR BLD AUTO: 33.8 %
HGB BLD-MCNC: 10.6 G/DL
HGB BLD-MCNC: 11 G/DL
HGB BLD-MCNC: 11.2 G/DL
INR PPP: 1.1
LA PPP-IMP: NEGATIVE
LACTATE SERPL-SCNC: 1.5 MMOL/L
LYMPHOCYTES # BLD AUTO: 2.5 K/UL
LYMPHOCYTES # BLD AUTO: 2.7 K/UL
LYMPHOCYTES # BLD AUTO: ABNORMAL K/UL
LYMPHOCYTES NFR BLD: 10 %
LYMPHOCYTES NFR BLD: 13.9 %
LYMPHOCYTES NFR BLD: 20.9 %
MAGNESIUM SERPL-MCNC: 1.6 MG/DL
MCH RBC QN AUTO: 28.8 PG
MCH RBC QN AUTO: 29.2 PG
MCH RBC QN AUTO: 29.8 PG
MCHC RBC AUTO-ENTMCNC: 32.9 %
MCHC RBC AUTO-ENTMCNC: 33.1 %
MCHC RBC AUTO-ENTMCNC: 34.2 %
MCV RBC AUTO: 87 FL
MCV RBC AUTO: 88 FL
MCV RBC AUTO: 88 FL
MONOCYTES # BLD AUTO: 1.3 K/UL
MONOCYTES # BLD AUTO: 1.5 K/UL
MONOCYTES # BLD AUTO: ABNORMAL K/UL
MONOCYTES NFR BLD: 10 %
MONOCYTES NFR BLD: 10.2 %
MONOCYTES NFR BLD: 8.4 %
MYELOCYTES NFR BLD MANUAL: 1 %
NEUTROPHILS # BLD AUTO: 13.3 K/UL
NEUTROPHILS # BLD AUTO: 8.7 K/UL
NEUTROPHILS NFR BLD: 67.1 %
NEUTROPHILS NFR BLD: 75.1 %
NEUTROPHILS NFR BLD: 77 %
NEUTS BAND NFR BLD MANUAL: 2 %
PHOSPHATE SERPL-MCNC: 3.6 MG/DL
PLATELET # BLD AUTO: 336 K/UL
PLATELET # BLD AUTO: 419 K/UL
PLATELET # BLD AUTO: 519 K/UL
PLATELET BLD QL SMEAR: ABNORMAL
PMV BLD AUTO: 10.3 FL
PMV BLD AUTO: 10.9 FL
PMV BLD AUTO: 11 FL
POCT GLUCOSE: 125 MG/DL (ref 70–110)
POIKILOCYTOSIS BLD QL SMEAR: ABNORMAL
POTASSIUM SERPL-SCNC: 4.1 MMOL/L
POTASSIUM SERPL-SCNC: 4.1 MMOL/L
PROT SERPL-MCNC: 5.8 G/DL
PROT SERPL-MCNC: 5.9 G/DL
PROTHROMBIN TIME: 11.2 SEC
RBC # BLD AUTO: 3.68 M/UL
RBC # BLD AUTO: 3.69 M/UL
RBC # BLD AUTO: 3.83 M/UL
SODIUM SERPL-SCNC: 133 MMOL/L
SODIUM SERPL-SCNC: 134 MMOL/L
WBC # BLD AUTO: 12.91 K/UL
WBC # BLD AUTO: 17.67 K/UL
WBC # BLD AUTO: 24.57 K/UL

## 2017-05-23 PROCEDURE — 3E0234Z INTRODUCTION OF SERUM, TOXOID AND VACCINE INTO MUSCLE, PERCUTANEOUS APPROACH: ICD-10-PCS | Performed by: SURGERY

## 2017-05-23 PROCEDURE — 0FT40ZZ RESECTION OF GALLBLADDER, OPEN APPROACH: ICD-10-PCS | Performed by: SURGERY

## 2017-05-23 PROCEDURE — 86900 BLOOD TYPING SEROLOGIC ABO: CPT

## 2017-05-23 PROCEDURE — 63600175 PHARM REV CODE 636 W HCPCS: Performed by: SURGERY

## 2017-05-23 PROCEDURE — 83735 ASSAY OF MAGNESIUM: CPT

## 2017-05-23 PROCEDURE — 0FJ44ZZ INSPECTION OF GALLBLADDER, PERCUTANEOUS ENDOSCOPIC APPROACH: ICD-10-PCS | Performed by: SURGERY

## 2017-05-23 PROCEDURE — 99223 1ST HOSP IP/OBS HIGH 75: CPT | Mod: 57,,, | Performed by: SURGERY

## 2017-05-23 PROCEDURE — 86850 RBC ANTIBODY SCREEN: CPT

## 2017-05-23 PROCEDURE — 85027 COMPLETE CBC AUTOMATED: CPT

## 2017-05-23 PROCEDURE — D9220A PRA ANESTHESIA: Mod: CRNA,,, | Performed by: NURSE ANESTHETIST, CERTIFIED REGISTERED

## 2017-05-23 PROCEDURE — 25000003 PHARM REV CODE 250: Performed by: HOSPITALIST

## 2017-05-23 PROCEDURE — 88304 TISSUE EXAM BY PATHOLOGIST: CPT | Mod: 26,,, | Performed by: PATHOLOGY

## 2017-05-23 PROCEDURE — D9220A PRA ANESTHESIA: Mod: ANES,,, | Performed by: ANESTHESIOLOGY

## 2017-05-23 PROCEDURE — 25000003 PHARM REV CODE 250: Performed by: SURGERY

## 2017-05-23 PROCEDURE — 83605 ASSAY OF LACTIC ACID: CPT

## 2017-05-23 PROCEDURE — 94761 N-INVAS EAR/PLS OXIMETRY MLT: CPT

## 2017-05-23 PROCEDURE — 36000709 HC OR TIME LEV III EA ADD 15 MIN: Performed by: SURGERY

## 2017-05-23 PROCEDURE — C9113 INJ PANTOPRAZOLE SODIUM, VIA: HCPCS | Performed by: SURGERY

## 2017-05-23 PROCEDURE — 86920 COMPATIBILITY TEST SPIN: CPT

## 2017-05-23 PROCEDURE — 63600175 PHARM REV CODE 636 W HCPCS: Performed by: STUDENT IN AN ORGANIZED HEALTH CARE EDUCATION/TRAINING PROGRAM

## 2017-05-23 PROCEDURE — 36000707: Performed by: SURGERY

## 2017-05-23 PROCEDURE — 93005 ELECTROCARDIOGRAM TRACING: CPT

## 2017-05-23 PROCEDURE — 85025 COMPLETE CBC W/AUTO DIFF WBC: CPT

## 2017-05-23 PROCEDURE — 25000003 PHARM REV CODE 250: Performed by: NURSE ANESTHETIST, CERTIFIED REGISTERED

## 2017-05-23 PROCEDURE — 63600175 PHARM REV CODE 636 W HCPCS: Performed by: INTERNAL MEDICINE

## 2017-05-23 PROCEDURE — 36000706: Performed by: SURGERY

## 2017-05-23 PROCEDURE — 82140 ASSAY OF AMMONIA: CPT

## 2017-05-23 PROCEDURE — 88304 TISSUE EXAM BY PATHOLOGIST: CPT | Performed by: PATHOLOGY

## 2017-05-23 PROCEDURE — 47600 CHOLECYSTECTOMY: CPT | Mod: GC,,, | Performed by: SURGERY

## 2017-05-23 PROCEDURE — 85007 BL SMEAR W/DIFF WBC COUNT: CPT

## 2017-05-23 PROCEDURE — 80053 COMPREHEN METABOLIC PANEL: CPT | Mod: 91

## 2017-05-23 PROCEDURE — 20000000 HC ICU ROOM

## 2017-05-23 PROCEDURE — 37000008 HC ANESTHESIA 1ST 15 MINUTES: Performed by: SURGERY

## 2017-05-23 PROCEDURE — 63600175 PHARM REV CODE 636 W HCPCS: Performed by: NURSE ANESTHETIST, CERTIFIED REGISTERED

## 2017-05-23 PROCEDURE — 27201423 OPTIME MED/SURG SUP & DEVICES STERILE SUPPLY: Performed by: SURGERY

## 2017-05-23 PROCEDURE — 36000708 HC OR TIME LEV III 1ST 15 MIN: Performed by: SURGERY

## 2017-05-23 PROCEDURE — 84100 ASSAY OF PHOSPHORUS: CPT

## 2017-05-23 PROCEDURE — 36415 COLL VENOUS BLD VENIPUNCTURE: CPT

## 2017-05-23 PROCEDURE — 25000003 PHARM REV CODE 250: Performed by: STUDENT IN AN ORGANIZED HEALTH CARE EDUCATION/TRAINING PROGRAM

## 2017-05-23 PROCEDURE — 93010 ELECTROCARDIOGRAM REPORT: CPT | Mod: ,,, | Performed by: INTERNAL MEDICINE

## 2017-05-23 PROCEDURE — 99232 SBSQ HOSP IP/OBS MODERATE 35: CPT | Mod: GC,,, | Performed by: HOSPITALIST

## 2017-05-23 PROCEDURE — 85730 THROMBOPLASTIN TIME PARTIAL: CPT

## 2017-05-23 PROCEDURE — 85610 PROTHROMBIN TIME: CPT

## 2017-05-23 PROCEDURE — 37000009 HC ANESTHESIA EA ADD 15 MINS: Performed by: SURGERY

## 2017-05-23 RX ORDER — PANTOPRAZOLE SODIUM 40 MG/10ML
40 INJECTION, POWDER, LYOPHILIZED, FOR SOLUTION INTRAVENOUS 2 TIMES DAILY
Status: DISCONTINUED | OUTPATIENT
Start: 2017-05-23 | End: 2017-05-29

## 2017-05-23 RX ORDER — HYDROMORPHONE HYDROCHLORIDE 2 MG/ML
INJECTION, SOLUTION INTRAMUSCULAR; INTRAVENOUS; SUBCUTANEOUS
Status: DISCONTINUED | OUTPATIENT
Start: 2017-05-23 | End: 2017-05-23

## 2017-05-23 RX ORDER — POTASSIUM CHLORIDE 7.45 MG/ML
40 INJECTION INTRAVENOUS
Status: DISCONTINUED | OUTPATIENT
Start: 2017-05-23 | End: 2017-05-26

## 2017-05-23 RX ORDER — ROCURONIUM BROMIDE 10 MG/ML
INJECTION, SOLUTION INTRAVENOUS
Status: DISCONTINUED | OUTPATIENT
Start: 2017-05-23 | End: 2017-05-23

## 2017-05-23 RX ORDER — CIPROFLOXACIN 500 MG/1
500 TABLET ORAL EVERY 12 HOURS
Status: DISCONTINUED | OUTPATIENT
Start: 2017-05-23 | End: 2017-05-23

## 2017-05-23 RX ORDER — ONDANSETRON 2 MG/ML
4 INJECTION INTRAMUSCULAR; INTRAVENOUS ONCE AS NEEDED
Status: DISCONTINUED | OUTPATIENT
Start: 2017-05-23 | End: 2017-05-23 | Stop reason: HOSPADM

## 2017-05-23 RX ORDER — LORAZEPAM 2 MG/ML
0.25 INJECTION INTRAMUSCULAR ONCE AS NEEDED
Status: DISCONTINUED | OUTPATIENT
Start: 2017-05-23 | End: 2017-05-23 | Stop reason: HOSPADM

## 2017-05-23 RX ORDER — CEFAZOLIN SODIUM 1 G/3ML
INJECTION, POWDER, FOR SOLUTION INTRAMUSCULAR; INTRAVENOUS
Status: DISCONTINUED | OUTPATIENT
Start: 2017-05-23 | End: 2017-05-23

## 2017-05-23 RX ORDER — MAGNESIUM SULFATE HEPTAHYDRATE 40 MG/ML
4 INJECTION, SOLUTION INTRAVENOUS
Status: DISCONTINUED | OUTPATIENT
Start: 2017-05-23 | End: 2017-05-26

## 2017-05-23 RX ORDER — HYDROMORPHONE HYDROCHLORIDE 1 MG/ML
0.2 INJECTION, SOLUTION INTRAMUSCULAR; INTRAVENOUS; SUBCUTANEOUS EVERY 5 MIN PRN
Status: DISCONTINUED | OUTPATIENT
Start: 2017-05-23 | End: 2017-05-23 | Stop reason: HOSPADM

## 2017-05-23 RX ORDER — CIPROFLOXACIN 2 MG/ML
400 INJECTION, SOLUTION INTRAVENOUS
Status: DISCONTINUED | OUTPATIENT
Start: 2017-05-23 | End: 2017-05-28

## 2017-05-23 RX ORDER — SUCCINYLCHOLINE CHLORIDE 20 MG/ML
INJECTION INTRAMUSCULAR; INTRAVENOUS
Status: DISCONTINUED | OUTPATIENT
Start: 2017-05-23 | End: 2017-05-23

## 2017-05-23 RX ORDER — POTASSIUM CHLORIDE 7.45 MG/ML
60 INJECTION INTRAVENOUS
Status: DISCONTINUED | OUTPATIENT
Start: 2017-05-23 | End: 2017-05-26

## 2017-05-23 RX ORDER — NEOSTIGMINE METHYLSULFATE 1 MG/ML
INJECTION, SOLUTION INTRAVENOUS
Status: DISCONTINUED | OUTPATIENT
Start: 2017-05-23 | End: 2017-05-23

## 2017-05-23 RX ORDER — METRONIDAZOLE 500 MG/100ML
500 INJECTION, SOLUTION INTRAVENOUS
Status: DISCONTINUED | OUTPATIENT
Start: 2017-05-23 | End: 2017-05-28

## 2017-05-23 RX ORDER — ONDANSETRON 2 MG/ML
INJECTION INTRAMUSCULAR; INTRAVENOUS
Status: DISCONTINUED | OUTPATIENT
Start: 2017-05-23 | End: 2017-05-23

## 2017-05-23 RX ORDER — GLYCOPYRROLATE 0.2 MG/ML
INJECTION INTRAMUSCULAR; INTRAVENOUS
Status: DISCONTINUED | OUTPATIENT
Start: 2017-05-23 | End: 2017-05-23

## 2017-05-23 RX ORDER — MAGNESIUM SULFATE HEPTAHYDRATE 40 MG/ML
2 INJECTION, SOLUTION INTRAVENOUS
Status: DISCONTINUED | OUTPATIENT
Start: 2017-05-23 | End: 2017-05-25 | Stop reason: CLARIF

## 2017-05-23 RX ORDER — POTASSIUM CHLORIDE 7.45 MG/ML
80 INJECTION INTRAVENOUS
Status: DISCONTINUED | OUTPATIENT
Start: 2017-05-23 | End: 2017-05-26

## 2017-05-23 RX ORDER — PROPOFOL 10 MG/ML
VIAL (ML) INTRAVENOUS
Status: DISCONTINUED | OUTPATIENT
Start: 2017-05-23 | End: 2017-05-23

## 2017-05-23 RX ORDER — SODIUM CHLORIDE 9 MG/ML
INJECTION, SOLUTION INTRAVENOUS CONTINUOUS PRN
Status: DISCONTINUED | OUTPATIENT
Start: 2017-05-23 | End: 2017-05-23

## 2017-05-23 RX ORDER — SODIUM CHLORIDE 0.9 % (FLUSH) 0.9 %
3 SYRINGE (ML) INJECTION
Status: DISCONTINUED | OUTPATIENT
Start: 2017-05-23 | End: 2017-05-29 | Stop reason: HOSPADM

## 2017-05-23 RX ORDER — SODIUM CHLORIDE, SODIUM LACTATE, POTASSIUM CHLORIDE, CALCIUM CHLORIDE 600; 310; 30; 20 MG/100ML; MG/100ML; MG/100ML; MG/100ML
INJECTION, SOLUTION INTRAVENOUS CONTINUOUS
Status: DISCONTINUED | OUTPATIENT
Start: 2017-05-23 | End: 2017-05-27

## 2017-05-23 RX ORDER — LIDOCAINE HCL/PF 100 MG/5ML
SYRINGE (ML) INTRAVENOUS
Status: DISCONTINUED | OUTPATIENT
Start: 2017-05-23 | End: 2017-05-23

## 2017-05-23 RX ORDER — PANTOPRAZOLE SODIUM 40 MG/1
40 TABLET, DELAYED RELEASE ORAL 2 TIMES DAILY
Status: DISCONTINUED | OUTPATIENT
Start: 2017-05-23 | End: 2017-05-23

## 2017-05-23 RX ORDER — MIDAZOLAM HYDROCHLORIDE 1 MG/ML
INJECTION, SOLUTION INTRAMUSCULAR; INTRAVENOUS
Status: DISCONTINUED | OUTPATIENT
Start: 2017-05-23 | End: 2017-05-23

## 2017-05-23 RX ORDER — FENTANYL CITRATE 50 UG/ML
INJECTION, SOLUTION INTRAMUSCULAR; INTRAVENOUS
Status: DISCONTINUED | OUTPATIENT
Start: 2017-05-23 | End: 2017-05-23

## 2017-05-23 RX ADMIN — FOLIC ACID: 5 INJECTION, SOLUTION INTRAMUSCULAR; INTRAVENOUS; SUBCUTANEOUS at 02:05

## 2017-05-23 RX ADMIN — ROCURONIUM BROMIDE 10 MG: 10 INJECTION, SOLUTION INTRAVENOUS at 11:05

## 2017-05-23 RX ADMIN — CIPROFLOXACIN HYDROCHLORIDE 500 MG: 250 TABLET, FILM COATED ORAL at 10:05

## 2017-05-23 RX ADMIN — MIDAZOLAM HYDROCHLORIDE 2 MG: 1 INJECTION, SOLUTION INTRAMUSCULAR; INTRAVENOUS at 11:05

## 2017-05-23 RX ADMIN — SODIUM CHLORIDE, SODIUM GLUCONATE, SODIUM ACETATE, POTASSIUM CHLORIDE, MAGNESIUM CHLORIDE, SODIUM PHOSPHATE, DIBASIC, AND POTASSIUM PHOSPHATE: .53; .5; .37; .037; .03; .012; .00082 INJECTION, SOLUTION INTRAVENOUS at 12:05

## 2017-05-23 RX ADMIN — HYDROMORPHONE HYDROCHLORIDE 0.4 MG: 2 INJECTION, SOLUTION INTRAMUSCULAR; INTRAVENOUS; SUBCUTANEOUS at 12:05

## 2017-05-23 RX ADMIN — MAGNESIUM SULFATE HEPTAHYDRATE 2 G: 40 INJECTION, SOLUTION INTRAVENOUS at 08:05

## 2017-05-23 RX ADMIN — ONDANSETRON 4 MG: 2 INJECTION INTRAMUSCULAR; INTRAVENOUS at 12:05

## 2017-05-23 RX ADMIN — GLYCOPYRROLATE 0.6 MG: 0.2 INJECTION, SOLUTION INTRAMUSCULAR; INTRAVENOUS at 12:05

## 2017-05-23 RX ADMIN — FENTANYL CITRATE 100 MCG: 50 INJECTION, SOLUTION INTRAMUSCULAR; INTRAVENOUS at 12:05

## 2017-05-23 RX ADMIN — METRONIDAZOLE 500 MG: 500 INJECTION, SOLUTION INTRAVENOUS at 02:05

## 2017-05-23 RX ADMIN — CEFTRIAXONE 2 G: 2 INJECTION, SOLUTION INTRAVENOUS at 06:05

## 2017-05-23 RX ADMIN — CEFAZOLIN 2 G: 1 INJECTION, POWDER, FOR SOLUTION INTRAVENOUS at 11:05

## 2017-05-23 RX ADMIN — LACTULOSE 10 G: 20 SOLUTION ORAL at 08:05

## 2017-05-23 RX ADMIN — ROCURONIUM BROMIDE 25 MG: 10 INJECTION, SOLUTION INTRAVENOUS at 12:05

## 2017-05-23 RX ADMIN — SODIUM CHLORIDE, SODIUM LACTATE, POTASSIUM CHLORIDE, AND CALCIUM CHLORIDE: .6; .31; .03; .02 INJECTION, SOLUTION INTRAVENOUS at 01:05

## 2017-05-23 RX ADMIN — CEFTRIAXONE 1 G: 1 INJECTION, SOLUTION INTRAVENOUS at 08:05

## 2017-05-23 RX ADMIN — FENTANYL CITRATE 50 MCG: 50 INJECTION, SOLUTION INTRAMUSCULAR; INTRAVENOUS at 11:05

## 2017-05-23 RX ADMIN — NICOTINE 1 PATCH: 21 PATCH, EXTENDED RELEASE TRANSDERMAL at 09:05

## 2017-05-23 RX ADMIN — HYDROMORPHONE HYDROCHLORIDE 0.4 MG: 2 INJECTION, SOLUTION INTRAMUSCULAR; INTRAVENOUS; SUBCUTANEOUS at 01:05

## 2017-05-23 RX ADMIN — SUCCINYLCHOLINE CHLORIDE 120 MG: 20 INJECTION, SOLUTION INTRAMUSCULAR; INTRAVENOUS at 11:05

## 2017-05-23 RX ADMIN — LIDOCAINE HYDROCHLORIDE 50 MG: 20 INJECTION, SOLUTION INTRAVENOUS at 11:05

## 2017-05-23 RX ADMIN — SODIUM CHLORIDE: 0.9 INJECTION, SOLUTION INTRAVENOUS at 11:05

## 2017-05-23 RX ADMIN — SODIUM CHLORIDE 500 ML: 0.9 INJECTION, SOLUTION INTRAVENOUS at 03:05

## 2017-05-23 RX ADMIN — NEOSTIGMINE METHYLSULFATE 5 MG: 1 INJECTION INTRAVENOUS at 12:05

## 2017-05-23 RX ADMIN — ROCURONIUM BROMIDE 5 MG: 10 INJECTION, SOLUTION INTRAVENOUS at 11:05

## 2017-05-23 RX ADMIN — METRONIDAZOLE 500 MG: 500 INJECTION, SOLUTION INTRAVENOUS at 10:05

## 2017-05-23 RX ADMIN — PROPOFOL 150 MG: 10 INJECTION, EMULSION INTRAVENOUS at 11:05

## 2017-05-23 RX ADMIN — ROCURONIUM BROMIDE 35 MG: 10 INJECTION, SOLUTION INTRAVENOUS at 11:05

## 2017-05-23 RX ADMIN — CIPROFLOXACIN 400 MG: 2 INJECTION, SOLUTION INTRAVENOUS at 03:05

## 2017-05-23 RX ADMIN — PANTOPRAZOLE SODIUM 40 MG: 40 INJECTION, POWDER, FOR SOLUTION INTRAVENOUS at 08:05

## 2017-05-23 RX ADMIN — FENTANYL CITRATE 100 MCG: 50 INJECTION, SOLUTION INTRAMUSCULAR; INTRAVENOUS at 11:05

## 2017-05-23 NOTE — BRIEF OP NOTE
[facility]  Brief Operative Note    SUMMARY     Surgery Date: 5/23/2017     Surgeon(s) and Role:     * Martha Hanna MD - Resident - Assisting     * Moses Boyce MD - Primary        Pre-op Diagnosis:  Obstructive jaundice [K83.8]    Post-op Diagnosis:  Post-Op Diagnosis Codes:     * Obstructive jaundice [K83.8]    Procedure(s) (LRB):  CHOLECYSTECTOMY. Laproscopic converted to open (N/A)    Anesthesia: General    Description of Procedure: lap yaritza converted to open- gallbladder consistent with acute cholecystitis, purulent fluid in abdomen, gallbladder filled with large stones, FRANCISCO drain left in place, nu knit left in gallbladder fossa    Description of the findings of the procedure: see op note    Estimated Blood Loss: 200 mL         Specimens:   Specimen (12h ago through future)    Start     Ordered    05/23/17 1225  Specimen to Pathology - Surgery  Once     Comments:  1. Gallbladder- perm      05/23/17 1225

## 2017-05-23 NOTE — ANESTHESIA PREPROCEDURE EVALUATION
05/23/2017  Jimmy Pink is a 56 y.o., male.    Anesthesia Evaluation    I have reviewed the Patient Summary Reports.    I have reviewed the Nursing Notes.   I have reviewed the Medications.     Review of Systems  Anesthesia Hx:  No problems with previous Anesthesia  History of prior surgery of interest to airway management or planning: Previous anesthesia: General Denies Family Hx of Anesthesia complications.   Denies Personal Hx of Anesthesia complications.   Social:  Smoker, Alcohol Use    Cardiovascular:   Exercise tolerance: poor    Hepatic/GI:   Liver Disease, Hepatitis, C        Physical Exam  General:  Obesity      Dental:  Dental Findings: Edentulous             Anesthesia Plan  Type of Anesthesia, risks & benefits discussed:  Anesthesia Type:  general  Patient's Preference:   Intra-op Monitoring Plan: standard ASA monitors  Intra-op Monitoring Plan Comments:   Post Op Pain Control Plan: multimodal analgesia and IV/PO Opiods PRN  Post Op Pain Control Plan Comments:   Induction:   IV  Beta Blocker:  Patient is not currently on a Beta-Blocker (No further documentation required).       Informed Consent: Patient understands risks and agrees with Anesthesia plan.  Questions answered. Anesthesia consent signed with patient.  ASA Score: 3     Day of Surgery Review of History & Physical:    H&P update referred to the surgeon.         Ready For Surgery From Anesthesia Perspective.

## 2017-05-23 NOTE — NURSING
Pt admitted to ICU from OR. Upon arrival pt connected to icu monitor, on simple face mask 6L. All vitals stable. Dr. Hanna at bedside aware of pts arrival. See chart for all admit orders received. See flowsheet for all vitals and complete assessment.    Admit skin note: All skin intact with no breakdown or redness noted.

## 2017-05-23 NOTE — TRANSFER OF CARE
"Anesthesia Transfer of Care Note    Patient: Jimmy Pink    Procedure(s) Performed: Procedure(s) (LRB):  CHOLECYSTECTOMY. Laproscopic converted to open (N/A)    Patient location: ICU    Anesthesia Type: general    Transport from OR: Transported from OR on 6-10 L/min O2 by face mask with adequate spontaneous ventilation    Post pain: adequate analgesia    Post assessment: no apparent anesthetic complications    Post vital signs: stable    Level of consciousness: alert, oriented and awake    Nausea/Vomiting: no nausea/vomiting    Complications: none    Transfer of care protocol was followed      Last vitals:   Visit Vitals  BP (!) 144/64   Pulse 92   Temp 36.8 °C (98.3 °F)   Resp 18   Ht 5' 10" (1.778 m)   Wt 90.7 kg (200 lb)   SpO2 98%   BMI 28.70 kg/m²     "

## 2017-05-23 NOTE — PROGRESS NOTES
Advanced Endoscopy Service (AES) Follow-up Note    Patient seen and examined. Chart reviewed and events noted. No signs or symptoms to suggest complications from ERCP with stone extraction yesterday.    Recommend:  Surgery evaluation for cholecystectomy  Heme Onc evaluation for portal vein thrombosis in this patient without evidence of pancreatitis or portal hypertension  Repeat EGD in 4-8 weeks to document healing of grade D esophagitis  Continue BID PPI until repeat EGD  Check HCV viral load and genotype and if positive refer to hepatology clinic as out-patient for treatment      Ernesto Madera  Gastroenterology Fellow (PGY VI)  Pager: [phone]

## 2017-05-23 NOTE — PROGRESS NOTES
Spoke with Dr. Maxwell with General Surgery. Patient will be transferred to their service at this time, as he is post op day 0 s/p lap converted to open cholecystectomy and now in the SICU.    IM6, medicine consults,are available for any assistance in medication adjustments moving forward and available to aide in caring for the patient should he require additional input from medicine.    Anju Griggs MD  IM4

## 2017-05-23 NOTE — PLAN OF CARE
Problem: Patient Care Overview  Goal: Plan of Care Review  Outcome: Ongoing (interventions implemented as appropriate)  Pt A&O x 4. VS stable on room air. No complaints of pain. Pt ambulating to toilet. Urinated x 3. 1 BM. Abdomen rigid on palpation, nontender. Bowel sound active. Pt slept in between care. Will continue to monitor pt.

## 2017-05-23 NOTE — H&P
History & Physical  Surgical Intensive Care    SUBJECTIVE:     Chief Complaint/Reason for Admission: Obstructive jaundice     History of Present Illness:  Patient is a 56 y.o. male with h/o alcohol and tobacco abuse, cirrhosis and portal hypertension, currently living in a homeless shelter, who was transferred from [place] due to obstructive jaundice.  He was noted to have orange urine and fatigue, and was sent to ED.  Labs demonstrated t bili of 21.5, alk phos 697, INR of 1.3, and wbc of 18.  Labs from 2 yrs ago all wnl.  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  He states he has lost some weight. Denies abd pain, fevers, chills, itching. Reports he stopped drinking a few weeks ago.     No major medical or surgical history. No family history of malignancy. Underwent and ERCP with stone clearance on 5/22/17 and open cholecystectomy on 5/23/17.     Transported to SICU extubated and hemodynamically stable s/p open cholecystectomy.      Review of patient's allergies indicates:  No Known Allergies    Past Medical History:   Diagnosis Date    Alcohol abuse     History of fracture of finger     Tobacco abuse      History reviewed. No pertinent surgical history.  Family History   Problem Relation Age of Onset    Heart disease Father     No Known Problems Sister      Social History   Substance Use Topics    Smoking status: Current Every Day Smoker     Packs/day: 0.50     Years: 20.00     Types: Cigarettes     Start date: 5/20/1997    Smokeless tobacco: Not on file    Alcohol use 1.8 oz/week     3 Cans of beer per week        Review of Systems:  Review of systems not obtained due to patient factors patient sleeping.    OBJECTIVE:     Vital Signs (Most Recent)  Temp: 97.7 °F (36.5 °C) (05/23/17 1600)  Pulse: 98 (05/23/17 1800)  Resp: 10 (05/23/17 1800)  BP: (!) 147/91 (05/23/17 1800)  SpO2: 98 % (05/23/17 1800)  Ventilator Data  (Last 24H):          Hemodynamic Parameters (Last 24H):       Physical Exam:  General: appears older than stated age  HENT: Head:normocephalic, atraumatic.  Neck: supple, trachea midline  Lungs:  normal respiratory effort  Cardiovascular: Heart: regular rate and rhythm.   Extremities: no cyanosis or edema, or clubbing. Pulses: 2+ and symmetric.  Abdomen: Abdomen: soft, non-distended, subcostal incision with surgical dressing in place.  Skin: Skin color, texture, turgor normal. No rashes or lesions  Neurologic: Normal strength and tone. No focal numbness or weakness    Lines/Drains:       Peripheral IV - Single Lumen 05/21/17 1810 Left Antecubital (Active)   Site Assessment Clean;Dry;Intact 5/23/2017  4:00 PM   Line Status Flushed;Saline locked 5/23/2017  4:00 PM   Dressing Status Clean;Dry;Intact 5/23/2017  4:00 PM   Dressing Intervention Dressing reinforced 5/22/2017  8:55 AM   Dressing Change Due 05/25/17 5/22/2017  9:12 PM   Site Change Due 05/25/17 5/22/2017  9:12 PM   Reason Not Rotated Not due 5/23/2017  4:00 PM   Number of days: 2            Peripheral IV - Single Lumen 05/22/17 0420 Right Forearm (Active)   Site Assessment Clean;Dry;Intact 5/23/2017  4:00 PM   Line Status Infusing 5/23/2017  4:00 PM   Dressing Status Clean;Dry;Intact 5/23/2017  4:00 PM   Dressing Change Due 05/26/17 5/22/2017  9:12 PM   Site Change Due 05/26/17 5/22/2017  9:12 PM   Reason Not Rotated Not due 5/23/2017  4:00 PM   Number of days: 1            Peripheral IV - Single Lumen 05/23/17 1156 Left Hand (Active)   Site Assessment Clean;Dry;Intact 5/23/2017  4:00 PM   Line Status Infusing 5/23/2017  4:00 PM   Dressing Status Clean;Dry;Intact 5/23/2017  4:00 PM   Reason Not Rotated Not due 5/23/2017  4:00 PM   Number of days: 0            Closed/Suction Drain 05/23/17 1226 Right RUQ Bulb 19 Fr. (Active)   Site Description Unable to view 5/23/2017  4:00 PM   Dressing Type Gauze 5/23/2017  4:00 PM   Dressing Status Clean;Dry;Intact  "5/23/2017  4:00 PM   Drainage Brown;Dark red 5/23/2017  4:00 PM   Status To bulb suction 5/23/2017  4:00 PM   Output (mL) 110 mL 5/23/2017  3:00 PM   Number of days: 0            Urethral Catheter 05/23/17 1200 Latex (Active)   Site Assessment Clean;Intact 5/23/2017  4:00 PM   Collection Container Urimeter 5/23/2017  4:00 PM   Securement Method secured to top of thigh w/ adhesive device 5/23/2017  4:00 PM   Catheter Care Performed yes 5/23/2017  4:00 PM   Reason for Continuing Urinary Catheterization Post operative 5/23/2017  4:00 PM   CAUTI Prevention Bundle StatLock in place w 1" slack 5/23/2017  1:15 PM   Output (mL) 30 mL 5/23/2017  6:00 PM   Number of days: 0       Laboratory  Labs reviewed    Chest X-Ray: None    Diagnostic Results:  No Further    ASSESSMENT/PLAN:       Plan:  Neuro: AAOX3; monitor for withdrawal symptoms    Pulmonary:  Sating well on 2L NC    Cardiac:RRR requiring no pressors    Renal: Adequate UOP    Infectious Disease: On cipro/flagyl    Hematology/Oncology:h/h stable     Endocrine: blood glucose WNL    Fluids/Electrolytes/Nutrition/GI: IVF @125 ml/hr; NPO overnight; thiamine daily    Pain Management: PRN pain meds    Dispo:Admit to SICU    Abraham Wilder M.D.  PGY 1        "

## 2017-05-23 NOTE — PLAN OF CARE
Problem: Patient Care Overview  Goal: Plan of Care Review  Plan of care reviewed with pt. Pt admitted to ICU from OR for open cholestectomy. Plan to monitor vitals and s/s ETOH withdrawals. Pt on 3L NC 02 sats 100%. All other vitals stable. uop 30-40cc/hr. 500cc NS bolus given

## 2017-05-23 NOTE — PLAN OF CARE
CM attempted to see pt - not in room; likely in OR; will attempt later - CM informed during IM4 huddle 5/22 that pt is homeless and was living at a shelter prior to admit.

## 2017-05-23 NOTE — PROGRESS NOTES
Progress Note  Hospital Medicine                                                              Team: [facility] 4    Patient Name: Jimmy Pink  YOB: 1960    Admit Date: 5/20/2017                     LOS: 3    SUBJECTIVE:     Reason for Admission:  Obstructive jaundice      Brief HPI:   Jimmy Pink is a 56 y.o. male with h/o alcohol/tobacco abuse, homeless living at shelter presented as a transfer from [facility] for AES evaluation of obstructive jaundice and a CT from OSH that showed periampullary mass with intra and extrahepatic dilatation, PVT, mild ascites.     Interval history:    NAEON. Pt is reconsidering surgery. Pt denies fever chills, nausea, vomiting, SOB, cough, or abdominal pain.        OBJECTIVE:     Vital Signs Range (Last 24H):  Temp:  [97.5 °F (36.4 °C)-98.7 °F (37.1 °C)]   Pulse:  [72-94]   Resp:  [16-20]   BP: (111-137)/(64-76)   SpO2:  [94 %-100 %] Body mass index is 28.75 kg/m².  Wt Readings from Last 1 Encounters:   05/22/17 0855 90.9 kg (200 lb 6.4 oz)   05/20/17 0458 90.9 kg (200 lb 4.8 oz)       I & O (Last 24H):    Intake/Output Summary (Last 24 hours) at 05/23/17 0723  Last data filed at 05/23/17 0100   Gross per 24 hour   Intake             1130 ml   Output                6 ml   Net             1124 ml       Physical Exam:  Constitutional: He is oriented to person, place, and time. He appears well-developed and well-nourished. No distress.   HENT:   Eyes: EOM are normal. Pupils are equal, round, and reactive to light. Scleral icterus is present..   Cardiovascular: Normal rate, regular rhythm, normal heart sounds and intact distal pulses.    No murmur heard.  Pulmonary/Chest: Effort normal and breath sounds normal. No respiratory distress. He has no wheezes. He has no rales.   Abdominal: Soft. Bowel sounds are normal. There is no tenderness. There is no No hepatic flap   Musculoskeletal: Normal range of motion. He exhibits no edema, tenderness or deformity.    Neurological: He is alert and oriented to person, place, and time.   Skin: Skin is warm and dry. He is not diaphoretic. Mild improvement of jaundice   Psychiatric: His behavior is normal.   Thought processing seems slow, but answers questions appropriately and accurately.  Flat affect.     Nursing note and vitals reviewed.    Diagnostic Results:  Lab Results   Component Value Date    WBC 12.91 (H) 05/23/2017    HGB 10.6 (L) 05/23/2017    HCT 32.2 (L) 05/23/2017    MCV 88 05/23/2017     05/23/2017       Recent Labs  Lab 05/23/17  0426   GLU 76   *   K 4.1      CO2 23   BUN 13   CREATININE 0.6   CALCIUM 8.3*     Lab Results   Component Value Date    INR 1.0 05/22/2017    INR 1.1 05/20/2017     No results found for: HGBA1C  No results for input(s): POCTGLUCOSE in the last 72 hours.    ASSESSMENT/PLAN:     Current Problems List:  Active Hospital Problems    Diagnosis  POA    *Obstructive jaundice [K83.8]  Yes    Jaundice [R17]  Yes    Portal vein thrombosis [I81]  Yes    Alcohol abuse [F10.10]  Yes    Hyperammonemia [E72.20]  Yes    Tobacco abuse [Z72.0]  Yes    Bacteriuria [R82.71]  Yes    Leukocytosis [D72.829]  Yes    Hypophosphatemia [E83.39]  Yes    Hypokalemia [E87.6]  Yes    Transaminitis [R74.0]  Yes    Hypoalbuminemia due to protein-calorie malnutrition [E46]  Yes      Resolved Hospital Problems    Diagnosis Date Resolved POA   No resolved problems to display.     55 yo M with non painful obstructive jaundice     Active Problems, Status & Treatment Plan Addressed Today:    Obstructive jaundice  - secondary to choledocholithiasis and cholelithiasis.   -OSH labs:  Tbili 21.5, Alk phos 697, ,   -OSH CT abdo from 5/19 concerning for periampullary mass  - Ca 19-9- elevated at 394 and normal AFP   -Hepatitis panel ordered   - AES consulted and recommend CT pancreas, consult to surg onc and plan for EUS on Monday.   - improvement of liver enzymes on labs   - CT pancreas  negative for mass or stone, positive for bile duct dilatation   - EUS- showed choledocholithiasis and cholelithiasis  - f/u general surgery recs.     Hepatitis C:  - positive hepatitis panel   - will benefit from hepatology follow up as OP.      Portal vein thrombosis  - unknown etiology   -As observed on CT at OSH on 5/19 (disc in patient's chart)  -ultrasound liver showed- thrombosis of the main portal vein.   - holding anticoagulation for portal vein thrombosis until surgery plans but may initiate enoxaparin at a later date.   - follow up hypercoagulability panel.     UTI  - received one dose of ceftriaxone at OSH  - urine culture growing GNR, ID pending  - urine culture was positive for acintobacter baumannii that's sensitive to cipro   - transitioned from ceftriaxone to cipro for 7 days.      Hyperammonemia  -Ammonia at OSH 97  -No hepatic flap or encephalopathy on exam  -Lactulose 20 g bid discontinued.      Alcohol abuse  -Chronic use for decades, 6 beers daily  -No history of withdrawal, no prior hospitalizations due to alcohol-related issues, no known varices  -Last use ~2 weeks ago, though questionable historian  -Monitor for signs of withdrawal  -folate wnl.  Thiamine and vit B12 pending   - banana bag ordered and thiamine 100 mg daily     Tobacco abuse  - on cessation   - nicotine patch ordered     Diet: adult regular   Prophylaxis: enoxaparin 40 mg - held for ERCP      Signing Physician:  Abdiel Murillo MD

## 2017-05-23 NOTE — MEDICAL/APP STUDENT
"[facility] Hospital Medicine  Progress Note    Patient Name: Jimmy Pink  MRN: [id number]  Patient Class: IP- Inpatient   Admission Date: 5/20/2017  Length of Stay: 3 days  Attending Physician: Abraham Gordillo MD  Primary Care Provider: Primary Doctor [facility] Medicine Team: [facility] MED 4 Martha Roman    Subjective:     Principal Problem:Obstructive jaundice    HPI: Jimmy Pink is a 56 y.o. male with h/o alcohol/tobacco abuse, homeless living at shelter presented as a transfer from [facility] for AES evaluation of obstructive jaundice and a CT from OSH that showed periampullary mass with intra and extrahepatic dilatation, PVT, mild ascites.     Hospital Course: 5/22 - EUS and ERCP - found to have choledocholithiasis and cholelithiasis.     CT abdomen: "Intra and extrahepatic biliary ductal dilatation without pancreatic ductal dilatation or obstructing stone or mass in the distal common bile duct.  further assessment with ERCP is recommended.  Main portal vein thrombosis involving intrahepatic branches with non-occlusive thrombus in the superior mesenteric vein, with associated upper limit of normal spleen and small volume mesenteric edema related to component of venous congestion.   Small hiatal hernia.  Moderate aortic atherosclerosis."    US Liver with Doppler: "Thrombosis of the main portal vein and with probable extension into the proximal part of the left portal vein with bidirectional flow in the right portal vein.  Correlation with outside hospital CT is recommended.  Interventional radiology consultation could be obtained to evaluate if the patient may benefit from portal venous thrombectomy.  Extensive intrahepatic biliary ductal dilatation of uncertain etiology.  Correlation with outside imaging, ERCP, or MRCP is recommended.  Splenomegaly in keeping with portal hypertension.  Cholelithiasis"    Upper EUS: "Two stones were visualized endosonographically in the " "common bile duct. They were hyperechoic and characterized by shadowing. EUS examination of the portal vein area was limited by stone shadowing. Multiple stones were visualized endosonographically in the gallbladder. They were hyperechoic and characterized by shadowing.    ERCP: "- Choledocholithiasis was found. Complete removal was accomplished by biliary sphincterotomy and balloon extraction of two large yellow stones (about 12 mm each). A mild localized biliary stricture was found. The stricture was inflammatory. Bile was draining across the stricture so no stent was placed."    Interval History: Mr. Pink reports feeling fine this morning. He slept well and ate dinner. I am unsure whether he is fully understanding what is going on and what the surgery will entail, as he asked me whether he needs his gallbladder out to survive. Otherwise no acute events.    Review of Systems   Constitutional: Negative for chills, fatigue and fever.   HENT: Negative.  Negative for rhinorrhea and sneezing.    Eyes: Negative.    Respiratory: Negative.  Negative for cough, chest tightness and shortness of breath.    Cardiovascular: Negative.  Negative for chest pain and leg swelling.   Gastrointestinal: Positive for abdominal distention. Negative for abdominal pain, diarrhea, nausea and vomiting.   Endocrine: Negative.    Genitourinary: Negative.    Musculoskeletal: Negative.    Skin: Positive for color change.   Neurological: Negative.    Hematological: Negative.    Psychiatric/Behavioral: Negative.      Objective:     Vital Signs (Most Recent):  Temp: 98.2 °F (36.8 °C) (05/23/17 0347)  Pulse: 92 (05/23/17 0347)  Resp: 20 (05/23/17 0347)  BP: 111/72 (05/23/17 0347)  SpO2: 96 % (05/23/17 0347) Vital Signs (24h Range):  Temp:  [97.5 °F (36.4 °C)-98.7 °F (37.1 °C)] 98.2 °F (36.8 °C)  Pulse:  [72-94] 92  Resp:  [16-20] 20  SpO2:  [94 %-100 %] 96 %  BP: (108-137)/(64-76) 111/72     Weight: 90.9 kg (200 lb 6.4 oz)  Body mass index is " 28.75 kg/m².    Intake/Output Summary (Last 24 hours) at 05/23/17 0635  Last data filed at 05/23/17 0100   Gross per 24 hour   Intake             1130 ml   Output                6 ml   Net             1124 ml      Physical Exam   Constitutional: He is oriented to person, place, and time. He appears well-developed and well-nourished.   HENT:   Head: Normocephalic and atraumatic.   Eyes: EOM are normal. Pupils are equal, round, and reactive to light. Scleral icterus is present.   Neck: Normal range of motion. Neck supple.   Cardiovascular: Normal rate, regular rhythm and intact distal pulses.    Pulmonary/Chest: Effort normal and breath sounds normal.   Abdominal: Soft. Bowel sounds are normal. He exhibits distension. There is no tenderness.   Musculoskeletal: Normal range of motion.   Neurological: He is alert and oriented to person, place, and time.   Skin: Skin is warm and dry.   Psychiatric: He has a normal mood and affect.       Significant Labs: All pertinent labs within the past 24 hours have been reviewed.    Significant Imaging: I have reviewed and interpreted all pertinent imaging results/findings within the past 24 hours.    Assessment/Plan:      Active Diagnoses:    Diagnosis Date Noted POA    PRINCIPAL PROBLEM:  Obstructive jaundice [K83.8] 05/20/2017 Yes    Jaundice [R17] 05/22/2017 Yes    Portal vein thrombosis [I81] 05/20/2017 Yes    Alcohol abuse [F10.10] 05/20/2017 Yes    Hyperammonemia [E72.20] 05/20/2017 Yes    Tobacco abuse [Z72.0] 05/20/2017 Yes    Bacteriuria [R82.71] 05/20/2017 Yes    Leukocytosis [D72.829] 05/20/2017 Yes    Hypophosphatemia [E83.39] 05/20/2017 Yes    Hypokalemia [E87.6] 05/20/2017 Yes    Transaminitis [R74.0] 05/20/2017 Yes    Hypoalbuminemia due to protein-calorie malnutrition [E46] 05/20/2017 Yes      Problems Resolved During this Admission:    Diagnosis Date Noted Date Resolved POA     VTE Risk Mitigation         Ordered     Medium Risk of VTE  Once       05/21/17 1026     Place NATHAN hose  Until discontinued      05/20/17 0559     Place sequential compression device  Until discontinued      05/20/17 0514        Assessment: Mr. Pink is a 57 yo M with non painful obstructive jaundice.    Active Problems, Status & Treatment Plan Addressed Today:     Obstructive jaundice  -OSH labs:  Tbili 21.5, Alk phos 697, ,   -OSH CT abdo from 5/19 concerning for periampullary mass  - Ca 19-9- elevated at 394 and normal AFP   -Hepatitis panel ordered   - AES consulted and recommend CT pancreas,   - improvement of liver enzymes on labs   - CT pancreas negative for mass or stone, positive for bile duct dilatation   -EUS and ERCP completed on 5/22, found cholelithiasis and choledocholithiasis.   -Follow up with surgery for cholecystectomy     Hepatitis C Positive:  - Follow up with OP hepatology for Hep. C status     Portal vein thrombosis  -As observed on CT at OSH on 5/19 (disc in patient's chart)  -Obtain ultrasound liver with doppler for further assessment  - hold anticoagulation until after surgery  - follow up with hypercoaguability studies     UTI  - received one dose of ceftriaxone at OSH  - urine culture growing GNR, ID pending  - will continue ceftriaxone daily     Hyperammonemia  -Ammonia at OSH 97  -No hepatic flap or encephalopathy on exam  -Lactulose 20 g bid  - Discontinue lactulose     Alcohol abuse  -Chronic use for decades, 6 beers daily  -No history of withdrawal, no prior hospitalizations due to alcohol-related issues, no known varices  -Last use ~2 weeks ago, though questionable historian  -Monitor for signs of withdrawal  -folate wnl.  Thiamine and vit B12 pending   - banana bag ordered and thiamine 100 mg daily      Tobacco abuse  - on cessation   - nicotine patch ordered      Diet: adult regular   Prophylaxis: enoxaparin 40 mg - held for ERCP    Martha Roman  Department of Hospital Medicine   [facility]

## 2017-05-23 NOTE — CONSULTS
[facility]  General Surgery  Consult Note    Consults  Subjective:     History of Present Illness: 55 y/o male with h/o alcohol and tobacco abuse, currently living in a homeless shelter, who was transferred from [place] due to obstructive jaundice.  He was noted to have orange urine and fatigue, and was sent to ED.  Labs demonstrated t bili of 21.5, alk phos 697, INR of 1.3, and wbc of 18.  Labs from 2 yrs ago all wnl.  He had a CT demonstrating a periampullary mass vs CBD stone with intra- / extra-hepatic biliary dilatation as well as portal vein thrombus. Underwent EUS and ERCP that showed choledocholithiasis and no signs of mass or malignancy. We have been consulted for cholecystectomy.     No current facility-administered medications on file prior to encounter.      No current outpatient prescriptions on file prior to encounter.       Review of patient's allergies indicates:  No Known Allergies    Past Medical History:   Diagnosis Date    Alcohol abuse     History of fracture of finger     Tobacco abuse      History reviewed. No pertinent surgical history.  Family History     Problem Relation (Age of Onset)    Heart disease Father    No Known Problems Sister        Social History Main Topics    Smoking status: Current Every Day Smoker     Packs/day: 0.50     Years: 20.00     Types: Cigarettes     Start date: 5/20/1997    Smokeless tobacco: Not on file    Alcohol use 1.8 oz/week     3 Cans of beer per week    Drug use: No    Sexual activity: Yes     Review of Systems   Constitutional: Negative for activity change, appetite change, chills, fatigue, fever and unexpected weight change.   HENT: Negative for congestion and trouble swallowing.    Respiratory: Negative for cough, shortness of breath and wheezing.    Cardiovascular: Negative for chest pain, palpitations and leg swelling.   Gastrointestinal: Negative for abdominal distention, abdominal pain, constipation, nausea and  vomiting.   Genitourinary: Negative for dysuria and urgency.   Musculoskeletal: Negative for back pain and neck pain.   Skin: Negative for rash and wound.   Neurological: Negative for dizziness, weakness and headaches.   Hematological: Negative for adenopathy.     Objective:     Vital Signs (Most Recent):  Temp: 98 °F (36.7 °C) (05/23/17 0742)  Pulse: 94 (05/23/17 0742)  Resp: 20 (05/23/17 0742)  BP: 119/64 (05/23/17 0742)  SpO2: (!) 92 % (05/23/17 0742) Vital Signs (24h Range):  Temp:  [97.5 °F (36.4 °C)-98.7 °F (37.1 °C)] 98 °F (36.7 °C)  Pulse:  [72-94] 94  Resp:  [16-20] 20  SpO2:  [92 %-100 %] 92 %  BP: (111-137)/(64-76) 119/64     Weight: 90.9 kg (200 lb 6.4 oz)  Body mass index is 28.75 kg/m².      Intake/Output Summary (Last 24 hours) at 05/23/17 0948  Last data filed at 05/23/17 0600   Gross per 24 hour   Intake          1175.83 ml   Output                2 ml   Net          1173.83 ml       Physical Exam   Constitutional: He is oriented to person, place, and time. He appears well-developed and well-nourished. No distress.   HENT:   Head: Normocephalic and atraumatic.   Eyes: Pupils are equal, round, and reactive to light. Scleral icterus is present.   Neck: Neck supple.   Cardiovascular: Normal rate, regular rhythm and normal heart sounds.    Pulmonary/Chest: Effort normal and breath sounds normal.   Abdominal: Soft. He exhibits no distension and no mass. There is no tenderness.   Musculoskeletal: He exhibits no edema.   Lymphadenopathy:     He has no cervical adenopathy.   Neurological: He is alert and oriented to person, place, and time.   Skin: Skin is warm and dry.   Psychiatric: He has a normal mood and affect.       Significant Labs:  CBC:   Recent Labs  Lab 05/23/17  0426   WBC 12.91*   RBC 3.68*   HGB 10.6*   HCT 32.2*      MCV 88   MCH 28.8   MCHC 32.9     CMP:   Recent Labs  Lab 05/23/17  0426   GLU 76   CALCIUM 8.3*   ALBUMIN 2.2*   PROT 5.9*   *   K 4.1   CO2 23      BUN 13    CREATININE 0.6   ALKPHOS 554*   *   AST 80*   BILITOT 7.2*     Coagulation:   Recent Labs  Lab 05/22/17  0525   INR 1.0       Significant Diagnostics:  Imaging reviewed    Assessment/Plan:   55 yo M admitted with choledocholithiasis, s/p ERCP.  -Initially refused surgery, but is fine with it now  -Plan for cholecystectomy today.    Active Diagnoses:    Diagnosis Date Noted POA    PRINCIPAL PROBLEM:  Obstructive jaundice [K83.8] 05/20/2017 Yes    Jaundice [R17] 05/22/2017 Yes    Portal vein thrombosis [I81] 05/20/2017 Yes    Alcohol abuse [F10.10] 05/20/2017 Yes    Hyperammonemia [E72.20] 05/20/2017 Yes    Tobacco abuse [Z72.0] 05/20/2017 Yes    Bacteriuria [R82.71] 05/20/2017 Yes    Leukocytosis [D72.829] 05/20/2017 Yes    Hypophosphatemia [E83.39] 05/20/2017 Yes    Hypokalemia [E87.6] 05/20/2017 Yes    Transaminitis [R74.0] 05/20/2017 Yes    Hypoalbuminemia due to protein-calorie malnutrition [E46] 05/20/2017 Yes      Problems Resolved During this Admission:    Diagnosis Date Noted Date Resolved POA       Vladimir Reyes MD  General Surgery  [facility]

## 2017-05-24 LAB
ALBUMIN SERPL BCP-MCNC: 2 G/DL
ALP SERPL-CCNC: 427 U/L
ALT SERPL W/O P-5'-P-CCNC: 95 U/L
ANION GAP SERPL CALC-SCNC: 8 MMOL/L
ANISOCYTOSIS BLD QL SMEAR: SLIGHT
AST SERPL-CCNC: 93 U/L
BACTERIA UR CULT: NORMAL
BASOPHILS # BLD AUTO: 0.05 K/UL
BASOPHILS NFR BLD: 0.2 %
BILIRUB SERPL-MCNC: 6.1 MG/DL
BUN SERPL-MCNC: 13 MG/DL
CALCIUM SERPL-MCNC: 8 MG/DL
CHLORIDE SERPL-SCNC: 101 MMOL/L
CO2 SERPL-SCNC: 23 MMOL/L
CREAT SERPL-MCNC: 0.7 MG/DL
DIFFERENTIAL METHOD: ABNORMAL
EOSINOPHIL # BLD AUTO: 0.1 K/UL
EOSINOPHIL NFR BLD: 0.3 %
ERYTHROCYTE [DISTWIDTH] IN BLOOD BY AUTOMATED COUNT: 17.5 %
EST. GFR  (AFRICAN AMERICAN): >60 ML/MIN/1.73 M^2
EST. GFR  (NON AFRICAN AMERICAN): >60 ML/MIN/1.73 M^2
F2 GENE MUT ANL BLD/T: NORMAL
F5 P.R506Q BLD/T QL: NORMAL
GLUCOSE SERPL-MCNC: 121 MG/DL
HCT VFR BLD AUTO: 30.4 %
HGB A2 MFR BLD HPLC: 2.9 %
HGB BLD-MCNC: 10.2 G/DL
HGB FRACT BLD ELPH-IMP: NORMAL
HGB FRACT BLD ELPH-IMP: NORMAL
HYPOCHROMIA BLD QL SMEAR: ABNORMAL
INR PPP: 1.1
INR PPP: 1.1
LACTATE SERPL-SCNC: 1.5 MMOL/L
LACTATE SERPL-SCNC: 1.6 MMOL/L
LACTATE SERPL-SCNC: 2.9 MMOL/L
LYMPHOCYTES # BLD AUTO: 2.2 K/UL
LYMPHOCYTES NFR BLD: 9.1 %
MAGNESIUM SERPL-MCNC: 2 MG/DL
MCH RBC QN AUTO: 29.7 PG
MCHC RBC AUTO-ENTMCNC: 33.6 %
MCV RBC AUTO: 88 FL
MONOCYTES # BLD AUTO: 2.7 K/UL
MONOCYTES NFR BLD: 11.3 %
NEUTROPHILS # BLD AUTO: 18.8 K/UL
NEUTROPHILS NFR BLD: 79.1 %
PHOSPHATE SERPL-MCNC: 3 MG/DL
PLATELET # BLD AUTO: 427 K/UL
PLATELET BLD QL SMEAR: ABNORMAL
PMV BLD AUTO: 9.7 FL
POLYCHROMASIA BLD QL SMEAR: ABNORMAL
POTASSIUM SERPL-SCNC: 4.3 MMOL/L
PROT SERPL-MCNC: 5.4 G/DL
PROTHROMBIN TIME: 11.5 SEC
PROTHROMBIN TIME: 11.5 SEC
RBC # BLD AUTO: 3.44 M/UL
SODIUM SERPL-SCNC: 132 MMOL/L
VANCOMYCIN TROUGH SERPL-MCNC: 3.8 UG/ML
WBC # BLD AUTO: 24.06 K/UL

## 2017-05-24 PROCEDURE — 80053 COMPREHEN METABOLIC PANEL: CPT

## 2017-05-24 PROCEDURE — 94761 N-INVAS EAR/PLS OXIMETRY MLT: CPT

## 2017-05-24 PROCEDURE — 84100 ASSAY OF PHOSPHORUS: CPT

## 2017-05-24 PROCEDURE — 27000221 HC OXYGEN, UP TO 24 HOURS

## 2017-05-24 PROCEDURE — 83605 ASSAY OF LACTIC ACID: CPT | Mod: 91

## 2017-05-24 PROCEDURE — 85610 PROTHROMBIN TIME: CPT

## 2017-05-24 PROCEDURE — 63600175 PHARM REV CODE 636 W HCPCS: Performed by: SURGERY

## 2017-05-24 PROCEDURE — 25000003 PHARM REV CODE 250: Performed by: SURGERY

## 2017-05-24 PROCEDURE — C9113 INJ PANTOPRAZOLE SODIUM, VIA: HCPCS | Performed by: SURGERY

## 2017-05-24 PROCEDURE — 25000003 PHARM REV CODE 250: Performed by: STUDENT IN AN ORGANIZED HEALTH CARE EDUCATION/TRAINING PROGRAM

## 2017-05-24 PROCEDURE — 94799 UNLISTED PULMONARY SVC/PX: CPT

## 2017-05-24 PROCEDURE — 63600175 PHARM REV CODE 636 W HCPCS

## 2017-05-24 PROCEDURE — 85025 COMPLETE CBC W/AUTO DIFF WBC: CPT

## 2017-05-24 PROCEDURE — 83605 ASSAY OF LACTIC ACID: CPT

## 2017-05-24 PROCEDURE — P9045 ALBUMIN (HUMAN), 5%, 250 ML: HCPCS | Performed by: SURGERY

## 2017-05-24 PROCEDURE — 83735 ASSAY OF MAGNESIUM: CPT

## 2017-05-24 PROCEDURE — 80202 ASSAY OF VANCOMYCIN: CPT

## 2017-05-24 PROCEDURE — 25000003 PHARM REV CODE 250: Performed by: INTERNAL MEDICINE

## 2017-05-24 PROCEDURE — 20000000 HC ICU ROOM

## 2017-05-24 RX ORDER — ENOXAPARIN SODIUM 100 MG/ML
40 INJECTION SUBCUTANEOUS EVERY 24 HOURS
Status: DISCONTINUED | OUTPATIENT
Start: 2017-05-24 | End: 2017-05-29 | Stop reason: HOSPADM

## 2017-05-24 RX ORDER — ALBUMIN HUMAN 50 G/1000ML
25 SOLUTION INTRAVENOUS ONCE
Status: COMPLETED | OUTPATIENT
Start: 2017-05-24 | End: 2017-05-24

## 2017-05-24 RX ADMIN — ALBUMIN (HUMAN) 25 G: 12.5 SOLUTION INTRAVENOUS at 07:05

## 2017-05-24 RX ADMIN — PANTOPRAZOLE SODIUM 40 MG: 40 INJECTION, POWDER, FOR SOLUTION INTRAVENOUS at 09:05

## 2017-05-24 RX ADMIN — VANCOMYCIN HYDROCHLORIDE 1250 MG: 100 INJECTION, POWDER, LYOPHILIZED, FOR SOLUTION INTRAVENOUS at 07:05

## 2017-05-24 RX ADMIN — METRONIDAZOLE 500 MG: 500 INJECTION, SOLUTION INTRAVENOUS at 05:05

## 2017-05-24 RX ADMIN — SODIUM CHLORIDE, SODIUM LACTATE, POTASSIUM CHLORIDE, AND CALCIUM CHLORIDE: .6; .31; .03; .02 INJECTION, SOLUTION INTRAVENOUS at 11:05

## 2017-05-24 RX ADMIN — VANCOMYCIN HYDROCHLORIDE 1250 MG: 100 INJECTION, POWDER, LYOPHILIZED, FOR SOLUTION INTRAVENOUS at 04:05

## 2017-05-24 RX ADMIN — METRONIDAZOLE 500 MG: 500 INJECTION, SOLUTION INTRAVENOUS at 09:05

## 2017-05-24 RX ADMIN — CIPROFLOXACIN 400 MG: 2 INJECTION, SOLUTION INTRAVENOUS at 01:05

## 2017-05-24 RX ADMIN — ENOXAPARIN SODIUM 40 MG: 100 INJECTION SUBCUTANEOUS at 04:05

## 2017-05-24 RX ADMIN — CEFTRIAXONE 2 G: 2 INJECTION, SOLUTION INTRAVENOUS at 05:05

## 2017-05-24 RX ADMIN — SODIUM CHLORIDE, SODIUM LACTATE, POTASSIUM CHLORIDE, AND CALCIUM CHLORIDE: .6; .31; .03; .02 INJECTION, SOLUTION INTRAVENOUS at 01:05

## 2017-05-24 RX ADMIN — THIAMINE HCL TAB 100 MG 100 MG: 100 TAB at 08:05

## 2017-05-24 RX ADMIN — METRONIDAZOLE 500 MG: 500 INJECTION, SOLUTION INTRAVENOUS at 01:05

## 2017-05-24 RX ADMIN — CIPROFLOXACIN 400 MG: 2 INJECTION, SOLUTION INTRAVENOUS at 02:05

## 2017-05-24 RX ADMIN — PANTOPRAZOLE SODIUM 40 MG: 40 INJECTION, POWDER, FOR SOLUTION INTRAVENOUS at 08:05

## 2017-05-24 RX ADMIN — NICOTINE 1 PATCH: 21 PATCH, EXTENDED RELEASE TRANSDERMAL at 08:05

## 2017-05-24 NOTE — PLAN OF CARE
Visited patient in ICU, RM 6074 A. Patient transferred to Dr. Boyce's service 5/23 s/p surgery. Not medically stable for discharge-close monitoring for ETOH withdrawal's, & diet not started as of yet. See below regarding discharge plan-currently lives at [facility] in [place], [place]. CM will continue to follow.     [facility] given to patient after informed about it;patient verbalized their understanding.        05/24/17 1225   Discharge Assessment   Assessment Type Discharge Planning Assessment   Confirmed/corrected address and phone number on facesheet? Yes   Assessment information obtained from? Patient;Medical Record;Other  (Caretaker at [facility] in [place], [place])   Expected Length of Stay (days) (6+)   Communicated expected length of stay with patient/caregiver no  (Per MD)   Type of Healthcare Directive Received (Unknown)   Prior to hospitilization cognitive status: Alert/Oriented;No Deficits   Prior to hospitalization functional status: Independent   Current cognitive status: Alert/Oriented;No Deficits   Current Functional Status: Independent;Needs Assistance   Arrived From acute care hospital   Lives With other (see comments)  (Lives at [facility]-see above)   Able to Return to Prior Arrangements yes   Is patient able to care for self after discharge? Yes   How many people do you have in your home that can help with your care after discharge? 1   Who are your caregiver(s) and their phone number(s)? (Caretaker at [facility] in [place], [place]: Angelia SARAH [phone]. She states the Shelter will pay for his medications, she will be his caretaker at shelter, & drive him to Shelter when discharged. )   Patient's perception of discharge disposition shelter  ([facility] in [place], [place])   Readmission Within The Last 30 Days unable to assess   Patient currently being followed by  outpatient case management? No   Patient currently receives home health services? No   Does the patient currently use HME? No   Patient currently receives private duty nursing? N/A   Patient currently receives any other outside agency services? No   Equipment Currently Used at Home none   Do you have any problems affording any of your prescribed medications? TBD   Is the patient taking medications as prescribed? (N/A not on anty medications)   Do you have any financial concerns preventing you from receiving the healthcare you need? Other (comments)  (TBD-lives at above homeless shelter)   Does the patient have transportation to healthcare appointments? Yes  (Angelia, [facility] caretaker, will make arrangements to get him to his 1st follow-up appt. Unsure about others. CM will ask admit dept to screen for Medicaid to start process. )   Transportation Available car;other (see comments)  ([facility] caretaker)   On Dialysis? No   Does the patient receive services at the [facility]? No   Are there any open cases? No   Discharge Plan A Shelter  ([facility] in [place], [place])   Discharge Plan B Skilled Nursing Facility  (PT/OT recs pending)   Patient/Family In Agreement With Plan other (see comments)  (PT/OT recs pending-unsure about SNF;Patient in agreement to return to Shelter as above. )

## 2017-05-24 NOTE — ASSESSMENT & PLAN NOTE
S/p EUS/ERCP with AES with duct cleared, no stent placed  Cholecystectomy 5/23- cholecystitis  Tbili trending down, wctm

## 2017-05-24 NOTE — PLAN OF CARE
Problem: Patient Care Overview  Goal: Plan of Care Review  Outcome: Ongoing (interventions implemented as appropriate)  VS & assessment as documented. Uneventful night. Pt remains AAOX3 calm and cooperative pleasantly confused at times. Plan of care reviewed with patient. Needs reinforcement.

## 2017-05-24 NOTE — PROGRESS NOTES
[facility]  General Surgery  Progress Note    Subjective:     History of Present Illness:  57 y/o male with h/o alcohol and tobacco abuse, currently living in a homeless shelter, who was transferred from [place] due to obstructive jaundice.  He was noted to have orange urine and fatigue, and was sent to ED.  Labs demonstrated t bili of 21.5, alk phos 697, INR of 1.3, and wbc of 18.  Labs from 2 yrs ago all wnl. He states he has lost some weight.      He specifically denies abd pain, fevers, chills, itching.      He states he stopped drinking a few weeks ago.      No major medical or surgical history. No family history of malignancy.     Post-Op Info:  Procedure(s) (LRB):  CHOLECYSTECTOMY. Laproscopic converted to open (N/A)   1 Day Post-Op     Interval History: NAEON. Sore at incision site.     Medications:  Continuous Infusions:   lactated ringers 125 mL/hr at 05/24/17 0800     Scheduled Meds:   cefTRIAXone (ROCEPHIN) IVPB  2 g Intravenous Q12H    ciprofloxacin  400 mg Intravenous Q12H    enoxaparin  40 mg Subcutaneous Daily    metronidazole  500 mg Intravenous Q8H    nicotine  1 patch Transdermal Daily    pantoprazole  40 mg Intravenous BID    thiamine  100 mg Oral Daily    vancomycin (VANCOCIN) IVPB  15 mg/kg Intravenous Q12H     PRN Meds:dextrose 50%, dextrose 50%, glucagon (human recombinant), glucose, glucose, magnesium sulfate IVPB, magnesium sulfate IVPB, ondansetron, potassium chloride **AND** potassium chloride **AND** potassium chloride, sodium chloride 0.9%, sodium phosphate IVPB, sodium phosphate IVPB, sodium phosphate IVPB, tramadol     Review of patient's allergies indicates:  No Known Allergies  Objective:     Vital Signs (Most Recent):  Temp: 98.6 °F (37 °C) (05/24/17 0700)  Pulse: 94 (05/24/17 0800)  Resp: (!) 22 (05/24/17 0800)  BP: 129/65 (05/24/17 0800)  SpO2: 97 % (05/24/17 0800) Vital Signs (24h Range):  Temp:  [97.7 °F (36.5 °C)-98.8 °F (37.1 °C)] 98.6 °F (37  °C)  Pulse:  [] 94  Resp:  [10-25] 22  SpO2:  [91 %-100 %] 97 %  BP: (118-167)/(64-98) 129/65     Weight: 90.7 kg (200 lb)  Body mass index is 28.7 kg/m².    Intake/Output - Last 3 Shifts       05/22 0700 - 05/23 0659 05/23 0700 - 05/24 0659 05/24 0700 - 05/25 0659    P.O. 120      I.V. (mL/kg) 1055.8 (11.6) 4259 (47)     IV Piggyback 100 500     Total Intake(mL/kg) 1275.8 (14) 4759 (52.5)     Urine (mL/kg/hr) 2 (0) 865 (0.4) 110 (0.7)    Emesis/NG output 0 (0)      Drains  380 (0.2) 5 (0)    Stool 0 (0) 0 (0)     Blood  200 (0.1)     Total Output 2 1445 115    Net +1273.8 +3314 -115           Urine Occurrence 8 x      Stool Occurrence 1 x 0 x     Emesis Occurrence 0 x            Physical Exam   Constitutional: He is oriented to person, place, and time. No distress.   HENT:   Head: Normocephalic.   Eyes: Scleral icterus is present.   Cardiovascular: Normal rate and regular rhythm.    Pulmonary/Chest: Effort normal.   Abdominal: Soft. He exhibits distension.   Dressing C/D/I  FRANCISCO with seropurulent drainage   Musculoskeletal: Normal range of motion.   Neurological: He is alert and oriented to person, place, and time.   Skin: Skin is warm and dry. He is not diaphoretic.   Psychiatric: He has a normal mood and affect. His behavior is normal.       Significant Labs:  CBC:   Recent Labs  Lab 05/24/17  0355   WBC 24.06*   RBC 3.44*   HGB 10.2*   HCT 30.4*   *   MCV 88   MCH 29.7   MCHC 33.6     CMP:   Recent Labs  Lab 05/24/17  0355   *   CALCIUM 8.0*   ALBUMIN 2.0*   PROT 5.4*   *   K 4.3   CO2 23      BUN 13   CREATININE 0.7   ALKPHOS 427*   ALT 95*   AST 93*   BILITOT 6.1*     Coagulation:   Recent Labs  Lab 05/23/17  1420 05/24/17  0543   INR 1.1 1.1  1.1   APTT 22.3  --      Lactate 2.9    Significant Diagnostics:  No new imaging    Assessment/Plan:     Hypoalbuminemia due to protein-calorie malnutrition    NPO for now, possible diet later this afternoon        Leukocytosis    Broaden  abx coverage today- cipro, flagyl, vanc        Bacteriuria    On cipro        Tobacco abuse    Nicotine patch in place        Hyperammonemia    Ammonia wnl yesterday, monitor        Alcohol abuse    Cont to monitor for signs of withdrawal  Banana bag yesterday        * Obstructive jaundice     S/p EUS/ERCP with AES with duct cleared, no stent placed  Cholecystectomy 5/23- cholecystitis  Tbili trending down, wctm          Possible pm stepdown if stable this afternoon    Martha Hanna MD  General Surgery  [facility]

## 2017-05-24 NOTE — PROGRESS NOTES
Progress Note  Surgical Intensive Care    Admit Date: 5/20/2017  Post-operative Day: 1 Day Post-Op  Hospital Day: 5    SUBJECTIVE:     Follow-up For:  Procedure(s) (LRB):  CHOLECYSTECTOMY. Laproscopic converted to open (N/A)    History of Present Illness:  Patient is a 56 y.o. male with h/o alcohol and tobacco abuse, cirrhosis and portal hypertension, currently living in a homeless shelter, who was [place] [facility] [facility] [place] [place].  He was noted to have orange urine and fatigue, and was sent to ED.  Labs demonstrated t bili of 21.5, alk phos 697, INR of 1.3, and wbc of 18.  Labs from 2 yrs ago all wnl.  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  He states he has lost some weight. Denies abd pain, fevers, chills, itching. Reports he stopped drinking a few weeks ago.       No major medical or surgical history. No family history of malignancy. Underwent and ERCP with stone clearance on 5/22/17 and open cholecystectomy on 5/23/17.      Transported to SICU extubated and hemodynamically stable s/p open cholecystectomy.    Interval history:  No acute events overnight. No overt s/s of etoh w/d overnight.    Continuous Infusions:   lactated ringers 125 mL/hr at 05/24/17 0700     Scheduled Meds:   albumin human 5%  25 g Intravenous Once    cefTRIAXone (ROCEPHIN) IVPB  2 g Intravenous Q12H    ciprofloxacin  400 mg Intravenous Q12H    lactated ringers  500 mL Intravenous Once    metronidazole  500 mg Intravenous Q8H    nicotine  1 patch Transdermal Daily    pantoprazole  40 mg Intravenous BID    thiamine  100 mg Oral Daily    vancomycin (VANCOCIN) IVPB  15 mg/kg Intravenous Q12H     PRN Meds:dextrose 50%, dextrose 50%, glucagon (human recombinant), glucose, glucose, magnesium sulfate IVPB, magnesium sulfate IVPB, ondansetron, potassium chloride **AND** potassium chloride **AND** potassium chloride, sodium  chloride 0.9%, sodium phosphate IVPB, sodium phosphate IVPB, sodium phosphate IVPB, tramadol    Review of patient's allergies indicates:  No Known Allergies    OBJECTIVE:     Vital Signs (Most Recent)  Temp: 98.8 °F (37.1 °C) (05/24/17 0300)  Pulse: 96 (05/24/17 0700)  Resp: 17 (05/24/17 0700)  BP: (!) 146/69 (05/24/17 0700)  SpO2: 97 % (05/24/17 0700)    Vital Signs Range (Last 24H):  Temp:  [97.7 °F (36.5 °C)-98.8 °F (37.1 °C)]   Pulse:  []   Resp:  [10-25]   BP: (118-167)/(64-98)   SpO2:  [91 %-100 %]     I & O (Last 24H):  Intake/Output Summary (Last 24 hours) at 05/24/17 0714  Last data filed at 05/24/17 0600   Gross per 24 hour   Intake             4759 ml   Output             1445 ml   Net             3314 ml     Ventilator Data (Last 24H):          Hemodynamic Parameters (Last 24H):       Physical Exam:  General: appears older than stated age, NAD  HENT: NCAT, PERRL  Lungs:  normal respiratory effort, CTAB  Cardiovascular: RRR, no murmur  Extremities: no cyanosis or edema, or clubbing. Pulses: 2+ and symmetric.  Abdomen: Abdomen: soft, non-distended, subcostal incision with surgical dressing in place.  Skin: Skin color, texture, turgor normal. No rashes or lesions  Neurologic: Normal strength and tone. No focal numbness or weakness      Lines/Drains:       Peripheral IV - Single Lumen 05/21/17 1810 Left Antecubital (Active)   Site Assessment Clean;Dry;Intact;No redness;No swelling 5/24/2017  3:00 AM   Line Status Flushed;Saline locked 5/24/2017  3:00 AM   Dressing Status Clean;Dry;Intact 5/24/2017  3:00 AM   Dressing Intervention Dressing reinforced 5/22/2017  8:55 AM   Dressing Change Due 05/25/17 5/24/2017  3:00 AM   Site Change Due 05/25/17 5/22/2017  9:12 PM   Reason Not Rotated Not due 5/24/2017  3:00 AM   Number of days: 2            Peripheral IV - Single Lumen 05/22/17 0420 Right Forearm (Active)   Site Assessment Clean;Dry;Intact;No redness;No swelling 5/24/2017  3:00 AM   Line Status Infusing  "5/24/2017  3:00 AM   Dressing Status Clean;Dry;Intact 5/24/2017  3:00 AM   Dressing Change Due 05/26/17 5/22/2017  9:12 PM   Site Change Due 05/26/17 5/23/2017  7:00 PM   Reason Not Rotated Not due 5/24/2017  3:00 AM   Number of days: 2            Peripheral IV - Single Lumen 05/23/17 1156 Left Hand (Active)   Site Assessment Clean;Dry;Intact;No redness;No swelling 5/24/2017  3:00 AM   Line Status Infusing 5/24/2017  3:00 AM   Dressing Status Clean;Dry;Intact 5/24/2017  3:00 AM   Site Change Due 05/27/17 5/23/2017  7:00 PM   Reason Not Rotated Not due 5/24/2017  3:00 AM   Number of days: 0            Closed/Suction Drain 05/23/17 1226 Right RUQ Bulb 19 Fr. (Active)   Site Description Unable to view 5/24/2017  3:00 AM   Dressing Type Gauze 5/24/2017  3:00 AM   Dressing Status Clean;Dry;Intact 5/24/2017  3:00 AM   Drainage Brown;Dark red 5/24/2017  3:00 AM   Status To bulb suction 5/24/2017  3:00 AM   Output (mL) 40 mL 5/24/2017  6:00 AM   Number of days: 0            Urethral Catheter 05/23/17 1200 Latex (Active)   Site Assessment Clean;Intact 5/24/2017  3:00 AM   Collection Container Urimeter 5/24/2017  3:00 AM   Securement Method secured to top of thigh w/ adhesive device 5/24/2017  3:00 AM   Catheter Care Performed yes 5/24/2017  3:00 AM   Reason for Continuing Urinary Catheterization Post operative 5/24/2017  3:00 AM   CAUTI Prevention Bundle StatLock in place w 1" slack 5/23/2017  7:00 PM   Output (mL) 30 mL 5/24/2017  6:00 AM   Number of days: 0       Laboratory (Last 24H):  CBC:    Recent Labs  Lab 05/24/17  0355   WBC 24.06*   HGB 10.2*   HCT 30.4*   *     CMP:    Recent Labs  Lab 05/24/17  0355   CALCIUM 8.0*   ALBUMIN 2.0*   PROT 5.4*   *   K 4.3   CO2 23      BUN 13   CREATININE 0.7   ALKPHOS 427*   ALT 95*   AST 93*   BILITOT 6.1*       Recent Labs  Lab 05/23/17  1420 05/24/17  0543   INR 1.1 1.1  1.1   APTT 22.3  --      No results for input(s): PH, PCO2, PO2, HCO3, POCSATURATED, BE " in the last 24 hours.      ASSESSMENT/PLAN:   Jimmy Pink is a 56 y.o. male with history of etoh abuse, homeless, initially admitted for work up of obstructive jaundice and CT evidence of periampullary mass with intra- and extra-hepatic biliary dilation, admitted to SICU on 5/23 s/p open cholecystectomy with drainage of pus, HDS and extubated in OR.    Plan:  Neuro:   - No acute issues  - Monitoring for etoh withdrawal (last drink reportedly a few weeks ago), pt admitted since 5/20 without overt s/s of withdrawal to this point  - prn tramadol    Pulmonary:   - No acute issues, satting well on NC    Cardiac:  - HDS  - Lactate uptrended to 2.9 this am from 1.5 postop; giving a fluid bolus and continue to trend    Renal:   - Trend BUN/Cr  - Marginal UOP overnight  - Fluid bolus as above  - Continue to monitor    Infectious Disease:   - WBC 24 this am, afebrile  - Trend CBC  - Cipro, flagyl, vanc - ceftriaxone discontinued    Hematology/Oncology:  - H/H stable postop, hg 10.2 this am  - Thrombocytosis (plt 427), likely reactive  - Continue to monitor  - Pt with portal vein thrombosis, anticoagulation held for surgery - resume lovenox    Endocrine:  - No acute issues  - Accuchecks while NPO    Fluids/Electrolytes/Nutrition/GI:   - mIVF LR at 125/h  - Thiamine daily  - Replete electrolytes prn  - Received 1L bolus yesterday; lactate uptrended today will bolus again  - NPO currently, can likely advance to clears this am  - Obstructive jaundice - Alk phos downtrending, T bili downtrending s/p yaritza    Dispo: Can likely stepdown today    Suad Rosas MD  SICU PGY-2

## 2017-05-24 NOTE — PROGRESS NOTES
No acute events throughout shift. All VSS. Pt remains AAOx4 calm and cooperative. Pt is still pleasantly confused at times. Pt remains on 2L NC. Plan of care reviewed with patient and all questions answered. Pt still needs reinforcement. See flowsheets for details. Will continue to monitor.

## 2017-05-24 NOTE — HPI
55 y/o male with h/o alcohol and tobacco abuse, currently living in a homeless shelter, who was transferred from [place] due to obstructive jaundice.  He was noted to have orange urine and fatigue, and was sent to ED.  Labs demonstrated t bili of 21.5, alk phos 697, INR of 1.3, and wbc of 18.  Labs from 2 yrs ago all wnl. He states he has lost some weight.      He specifically denies abd pain, fevers, chills, itching.      He states he stopped drinking a few weeks ago.      No major medical or surgical history. No family history of malignancy.

## 2017-05-24 NOTE — SUBJECTIVE & OBJECTIVE
Interval History: NAEON. Sore at incision site.     Medications:  Continuous Infusions:   lactated ringers 125 mL/hr at 05/24/17 0800     Scheduled Meds:   cefTRIAXone (ROCEPHIN) IVPB  2 g Intravenous Q12H    ciprofloxacin  400 mg Intravenous Q12H    enoxaparin  40 mg Subcutaneous Daily    metronidazole  500 mg Intravenous Q8H    nicotine  1 patch Transdermal Daily    pantoprazole  40 mg Intravenous BID    thiamine  100 mg Oral Daily    vancomycin (VANCOCIN) IVPB  15 mg/kg Intravenous Q12H     PRN Meds:dextrose 50%, dextrose 50%, glucagon (human recombinant), glucose, glucose, magnesium sulfate IVPB, magnesium sulfate IVPB, ondansetron, potassium chloride **AND** potassium chloride **AND** potassium chloride, sodium chloride 0.9%, sodium phosphate IVPB, sodium phosphate IVPB, sodium phosphate IVPB, tramadol     Review of patient's allergies indicates:  No Known Allergies  Objective:     Vital Signs (Most Recent):  Temp: 98.6 °F (37 °C) (05/24/17 0700)  Pulse: 94 (05/24/17 0800)  Resp: (!) 22 (05/24/17 0800)  BP: 129/65 (05/24/17 0800)  SpO2: 97 % (05/24/17 0800) Vital Signs (24h Range):  Temp:  [97.7 °F (36.5 °C)-98.8 °F (37.1 °C)] 98.6 °F (37 °C)  Pulse:  [] 94  Resp:  [10-25] 22  SpO2:  [91 %-100 %] 97 %  BP: (118-167)/(64-98) 129/65     Weight: 90.7 kg (200 lb)  Body mass index is 28.7 kg/m².    Intake/Output - Last 3 Shifts       05/22 0700 - 05/23 0659 05/23 0700 - 05/24 0659 05/24 0700 - 05/25 0659    P.O. 120      I.V. (mL/kg) 1055.8 (11.6) 4259 (47)     IV Piggyback 100 500     Total Intake(mL/kg) 1275.8 (14) 4759 (52.5)     Urine (mL/kg/hr) 2 (0) 865 (0.4) 110 (0.7)    Emesis/NG output 0 (0)      Drains  380 (0.2) 5 (0)    Stool 0 (0) 0 (0)     Blood  200 (0.1)     Total Output 2 1445 115    Net +1273.8 +3314 -115           Urine Occurrence 8 x      Stool Occurrence 1 x 0 x     Emesis Occurrence 0 x            Physical Exam   Constitutional: He is oriented to person, place, and time. No  distress.   HENT:   Head: Normocephalic.   Eyes: Scleral icterus is present.   Cardiovascular: Normal rate and regular rhythm.    Pulmonary/Chest: Effort normal.   Abdominal: Soft. He exhibits distension.   Dressing C/D/I  FRANCISCO with seropurulent drainage   Musculoskeletal: Normal range of motion.   Neurological: He is alert and oriented to person, place, and time.   Skin: Skin is warm and dry. He is not diaphoretic.   Psychiatric: He has a normal mood and affect. His behavior is normal.       Significant Labs:  CBC:   Recent Labs  Lab 05/24/17  0355   WBC 24.06*   RBC 3.44*   HGB 10.2*   HCT 30.4*   *   MCV 88   MCH 29.7   MCHC 33.6     CMP:   Recent Labs  Lab 05/24/17  0355   *   CALCIUM 8.0*   ALBUMIN 2.0*   PROT 5.4*   *   K 4.3   CO2 23      BUN 13   CREATININE 0.7   ALKPHOS 427*   ALT 95*   AST 93*   BILITOT 6.1*     Coagulation:   Recent Labs  Lab 05/23/17  1420 05/24/17  0543   INR 1.1 1.1  1.1   APTT 22.3  --      Lactate 2.9    Significant Diagnostics:  No new imaging

## 2017-05-25 PROBLEM — E83.42 HYPOMAGNESEMIA: Status: ACTIVE | Noted: 2017-05-25

## 2017-05-25 LAB
ALBUMIN SERPL BCP-MCNC: 2.1 G/DL
ALP SERPL-CCNC: 311 U/L
ALT SERPL W/O P-5'-P-CCNC: 62 U/L
ANION GAP SERPL CALC-SCNC: 8 MMOL/L
APTT PROTEIN C ACTIVATOR+FV DP/APTT PPP: 51 %
AST SERPL-CCNC: 56 U/L
AT III ACT/NOR PPP CHRO: 101 %
B2 GLYCOPROT1 IGA SER QL: <9 SAU
B2 GLYCOPROT1 IGG SER QL: <9 SGU
B2 GLYCOPROT1 IGM SER QL: <9 SMU
BASOPHILS # BLD AUTO: 0.03 K/UL
BASOPHILS NFR BLD: 0.2 %
BILIRUB SERPL-MCNC: 5.2 MG/DL
BUN SERPL-MCNC: 10 MG/DL
CALCIUM SERPL-MCNC: 8.1 MG/DL
CHLORIDE SERPL-SCNC: 102 MMOL/L
CO2 SERPL-SCNC: 25 MMOL/L
CREAT SERPL-MCNC: 0.6 MG/DL
DIFFERENTIAL METHOD: ABNORMAL
EOSINOPHIL # BLD AUTO: 0.1 K/UL
EOSINOPHIL NFR BLD: 0.3 %
ERYTHROCYTE [DISTWIDTH] IN BLOOD BY AUTOMATED COUNT: 17.4 %
EST. GFR  (AFRICAN AMERICAN): >60 ML/MIN/1.73 M^2
EST. GFR  (NON AFRICAN AMERICAN): >60 ML/MIN/1.73 M^2
GLUCOSE SERPL-MCNC: 113 MG/DL
HCT VFR BLD AUTO: 26.8 %
HGB BLD-MCNC: 8.9 G/DL
INR PPP: 1.1
LACTATE SERPL-SCNC: 1.1 MMOL/L
LYMPHOCYTES # BLD AUTO: 2.1 K/UL
LYMPHOCYTES NFR BLD: 12 %
MAGNESIUM SERPL-MCNC: 1.6 MG/DL
MCH RBC QN AUTO: 29.3 PG
MCHC RBC AUTO-ENTMCNC: 33.2 %
MCV RBC AUTO: 88 FL
MONOCYTES # BLD AUTO: 1.9 K/UL
MONOCYTES NFR BLD: 10.8 %
NEUTROPHILS # BLD AUTO: 13.1 K/UL
NEUTROPHILS NFR BLD: 76 %
PHOSPHATE SERPL-MCNC: 1.9 MG/DL
PLATELET # BLD AUTO: 300 K/UL
PMV BLD AUTO: 10 FL
POTASSIUM SERPL-SCNC: 3.5 MMOL/L
PROT S ACT/NOR PPP: 92 %
PROT SERPL-MCNC: 4.9 G/DL
PROTHROMBIN TIME: 12 SEC
RBC # BLD AUTO: 3.04 M/UL
SODIUM SERPL-SCNC: 135 MMOL/L
VANCOMYCIN TROUGH SERPL-MCNC: 3.9 UG/ML
WBC # BLD AUTO: 17.26 K/UL

## 2017-05-25 PROCEDURE — 63600175 PHARM REV CODE 636 W HCPCS: Performed by: SURGERY

## 2017-05-25 PROCEDURE — 25000003 PHARM REV CODE 250: Performed by: STUDENT IN AN ORGANIZED HEALTH CARE EDUCATION/TRAINING PROGRAM

## 2017-05-25 PROCEDURE — 83735 ASSAY OF MAGNESIUM: CPT

## 2017-05-25 PROCEDURE — 80053 COMPREHEN METABOLIC PANEL: CPT

## 2017-05-25 PROCEDURE — 85025 COMPLETE CBC W/AUTO DIFF WBC: CPT

## 2017-05-25 PROCEDURE — 63600175 PHARM REV CODE 636 W HCPCS: Performed by: STUDENT IN AN ORGANIZED HEALTH CARE EDUCATION/TRAINING PROGRAM

## 2017-05-25 PROCEDURE — 85610 PROTHROMBIN TIME: CPT

## 2017-05-25 PROCEDURE — 83605 ASSAY OF LACTIC ACID: CPT

## 2017-05-25 PROCEDURE — C9113 INJ PANTOPRAZOLE SODIUM, VIA: HCPCS | Performed by: SURGERY

## 2017-05-25 PROCEDURE — 94761 N-INVAS EAR/PLS OXIMETRY MLT: CPT

## 2017-05-25 PROCEDURE — 84100 ASSAY OF PHOSPHORUS: CPT

## 2017-05-25 PROCEDURE — 63600175 PHARM REV CODE 636 W HCPCS

## 2017-05-25 PROCEDURE — 25000003 PHARM REV CODE 250: Performed by: SURGERY

## 2017-05-25 PROCEDURE — 97161 PT EVAL LOW COMPLEX 20 MIN: CPT

## 2017-05-25 PROCEDURE — 20600001 HC STEP DOWN PRIVATE ROOM

## 2017-05-25 PROCEDURE — 25000003 PHARM REV CODE 250: Performed by: INTERNAL MEDICINE

## 2017-05-25 PROCEDURE — 97165 OT EVAL LOW COMPLEX 30 MIN: CPT

## 2017-05-25 PROCEDURE — 80202 ASSAY OF VANCOMYCIN: CPT

## 2017-05-25 RX ORDER — PROPRANOLOL HYDROCHLORIDE 10 MG/1
20 TABLET ORAL 2 TIMES DAILY
Status: DISCONTINUED | OUTPATIENT
Start: 2017-05-25 | End: 2017-05-29 | Stop reason: HOSPADM

## 2017-05-25 RX ORDER — BISACODYL 10 MG
10 SUPPOSITORY, RECTAL RECTAL ONCE
Status: COMPLETED | OUTPATIENT
Start: 2017-05-25 | End: 2017-05-25

## 2017-05-25 RX ORDER — PROPRANOLOL HYDROCHLORIDE 20 MG/1
20 TABLET ORAL 2 TIMES DAILY
Qty: 180 TABLET | Refills: 3 | Status: SHIPPED | OUTPATIENT
Start: 2017-05-25 | End: 2018-05-25

## 2017-05-25 RX ADMIN — PROPRANOLOL HYDROCHLORIDE 20 MG: 10 TABLET ORAL at 10:05

## 2017-05-25 RX ADMIN — BISACODYL 10 MG: 10 SUPPOSITORY RECTAL at 09:05

## 2017-05-25 RX ADMIN — VANCOMYCIN HYDROCHLORIDE 1250 MG: 100 INJECTION, POWDER, LYOPHILIZED, FOR SOLUTION INTRAVENOUS at 04:05

## 2017-05-25 RX ADMIN — PROPRANOLOL HYDROCHLORIDE 20 MG: 10 TABLET ORAL at 09:05

## 2017-05-25 RX ADMIN — METRONIDAZOLE 500 MG: 500 INJECTION, SOLUTION INTRAVENOUS at 02:05

## 2017-05-25 RX ADMIN — SODIUM PHOSPHATE, MONOBASIC, MONOHYDRATE 20.01 MMOL: 276; 142 INJECTION, SOLUTION INTRAVENOUS at 09:05

## 2017-05-25 RX ADMIN — CIPROFLOXACIN 400 MG: 2 INJECTION, SOLUTION INTRAVENOUS at 02:05

## 2017-05-25 RX ADMIN — MAGNESIUM SULFATE HEPTAHYDRATE 2 G: 500 INJECTION, SOLUTION INTRAMUSCULAR; INTRAVENOUS at 07:05

## 2017-05-25 RX ADMIN — NICOTINE 1 PATCH: 21 PATCH, EXTENDED RELEASE TRANSDERMAL at 09:05

## 2017-05-25 RX ADMIN — ENOXAPARIN SODIUM 40 MG: 100 INJECTION SUBCUTANEOUS at 04:05

## 2017-05-25 RX ADMIN — PANTOPRAZOLE SODIUM 40 MG: 40 INJECTION, POWDER, FOR SOLUTION INTRAVENOUS at 10:05

## 2017-05-25 RX ADMIN — METRONIDAZOLE 500 MG: 500 INJECTION, SOLUTION INTRAVENOUS at 05:05

## 2017-05-25 RX ADMIN — POTASSIUM CHLORIDE 40 MEQ: 10 INJECTION, SOLUTION INTRAVENOUS at 05:05

## 2017-05-25 RX ADMIN — PANTOPRAZOLE SODIUM 40 MG: 40 INJECTION, POWDER, FOR SOLUTION INTRAVENOUS at 09:05

## 2017-05-25 RX ADMIN — THIAMINE HCL TAB 100 MG 100 MG: 100 TAB at 09:05

## 2017-05-25 RX ADMIN — METRONIDAZOLE 500 MG: 500 INJECTION, SOLUTION INTRAVENOUS at 10:05

## 2017-05-25 NOTE — PLAN OF CARE
Problem: Occupational Therapy Goal  Goal: Occupational Therapy Goal  Outcome: Outcome(s) achieved Date Met: 05/25/17  OT cecilia completed. No goals set as pt is performing ADL at baseline level

## 2017-05-25 NOTE — OP NOTE
DATE OF PROCEDURE:  05/23/2017    PREOPERATIVE DIAGNOSES:  Choledocholithiasis and cholecystitis.    POSTOPERATIVE DIAGNOSES:  Choledocholithiasis and cholecystitis.    PROCEDURE PERFORMED:  Attempted laparoscopic with subsequent open   cholecystectomy.    SURGEON:  Moses Boyce M.D.    ASSISTANT:  Martha Hanna M.D. (RES)    ANESTHESIA:  General.    CLINICAL NOTE:  This patient is a 56-year-old male who presented to the   Emergency Department with obstructive jaundice.  He has had ERCP and clearance   of his common bile duct, which contained stones and also has evidence   radiographically of cholecystitis.  He was brought to the OR for laparoscopic   cholecystectomy.    PROCEDURE NOTE:  Following induction of adequate general anesthesia, the   patient's abdomen was shaved, prepped and draped in a sterile fashion with   ChloraPrep.  We made an infraumbilical incision dissected down the linea alba,   which we then incised and entered the peritoneal cavity under direct vision.  We   placed a balloon tip trocar through this site and insufflated creating a   pneumoperitoneum of 15 mmHg intraabdominal pressure.  I then placed a   laparoscopic camera through this port, which documented our proper positioning   and also allowed the placement of an additional upper midline 11 mm port and two   5 mm subcostal ports.  We then explored the abdomen.  The patient has   significant amount of turbid ascites, which we aspirated with suction.  I was   able to grasp the very inflamed gallbladder packed with stones and we retracted   it over the dome of the liver.  We began taking down adhesions of omentum from   the gallbladder and there was very thick inflammatory rind on the gallbladder   and we encountered significant bleeding from the omentum and with any dissection   of the gallbladder, I elected to convert to open procedure.    I made a generous subcostal incision and then transected both the anterior and   posterior  sheaths of the rectus abdominal muscle with cautery and entered the   peritoneal cavity.  We aspirated free more ascites and some blood and we were   able to get hemostasis of the omental attachments that we had dissected down and   then I took the gallbladder down retrograde from its attachments to the liver   bed down to the junction of the neck and cystic duct, which we then ligated and   removed the gallbladder.  I had previously encountered the cystic artery, which   was also ligated.  We then irrigated and obtained final hemostasis with cautery.    I placed a closed suction drain in this area of the gallbladder fossa in   [place] pouch and then we closed both anterior and posterior sheaths with   running PDS suture and then staples were applied to the skin.  Sterile dressings   were then applied and the procedure was terminated with the patient tolerating   it well.    ESTIMATED BLOOD LOSS:  250 mL.      MCT/THEO  dd: 05/24/2017 08:31:24 (CDT)  td: 05/25/2017 01:46:35 (CDT)  Doc ID   #[id number]  Job ID #[id number]    CC:

## 2017-05-25 NOTE — PLAN OF CARE
Problem: Patient Care Overview  Goal: Plan of Care Review  Outcome: Ongoing (interventions implemented as appropriate)  Plan of care reviewed with patient in reference to administration of supplemental oxygen via nasal cannula as needed, assisted ambulation to restroom with bed alarm reminders, administration of continuous fluids, administration of IV antibiotics, reorientation, monitoring of daily labs, and comfort management. Uneventful night; pt still in frequent need of reminders about getting out of bed without assistance. All questions and concerns were answered and addressed. Pt verbalized understanding.

## 2017-05-25 NOTE — NURSING
VSS. AAOx4 but patient impulsive. Getting OOB all day despite constant and frequent redirection and reorientation. Bed alarm in use. Expressed concerns about safety upon stepping down to [facility]. Unable to keep patient in bed here in ICU. Pulled out 2 IVs and izaguirre catheter. Sitter ordered. No sitter available here in ICU today. Frequent monitoring and safety precautions in place. Will cont to monitor.

## 2017-05-25 NOTE — PROGRESS NOTES
[facility]  General Surgery  Progress Note    Subjective:     History of Present Illness:  55 y/o male with h/o alcohol and tobacco abuse, currently living in a homeless shelter, who was transferred from [place] due to obstructive jaundice.  He was noted to have orange urine and fatigue, and was sent to ED.  Labs demonstrated t bili of 21.5, alk phos 697, INR of 1.3, and wbc of 18.  Labs from 2 yrs ago all wnl. He states he has lost some weight.      He specifically denies abd pain, fevers, chills, itching.      He states he stopped drinking a few weeks ago.      No major medical or surgical history. No family history of malignancy.     Post-Op Info:  Procedure(s) (LRB):  CHOLECYSTECTOMY. Laproscopic converted to open (N/A)  CHOLECYSTECTOMY-LAPAROSCOPIC (N/A)   2 Days Post-Op     Interval History: NAEON. Pain controlled. No flatus.    Medications:  Continuous Infusions:   lactated ringers 125 mL/hr at 05/25/17 1000     Scheduled Meds:   ciprofloxacin  400 mg Intravenous Q12H    enoxaparin  40 mg Subcutaneous Daily    metronidazole  500 mg Intravenous Q8H    nicotine  1 patch Transdermal Daily    pantoprazole  40 mg Intravenous BID    propranolol  20 mg Oral BID    thiamine  100 mg Oral Daily    vancomycin (VANCOCIN) IVPB  15 mg/kg Intravenous Q12H     PRN Meds:dextrose 50%, dextrose 50%, glucagon (human recombinant), glucose, glucose, magnesium sulfate IVPB, magnesium sulfate IVPB, ondansetron, potassium chloride **AND** potassium chloride **AND** potassium chloride, sodium chloride 0.9%, sodium phosphate IVPB, sodium phosphate IVPB, sodium phosphate IVPB, tramadol     Review of patient's allergies indicates:  No Known Allergies  Objective:     Vital Signs (Most Recent):  Temp: 98 °F (36.7 °C) (05/25/17 0700)  Pulse: 83 (05/25/17 1000)  Resp: (!) 22 (05/25/17 1000)  BP: 130/72 (05/25/17 1000)  SpO2: 98 % (05/25/17 1000) Vital Signs (24h Range):  Temp:  [98 °F (36.7 °C)-98.5 °F (36.9  °C)] 98 °F (36.7 °C)  Pulse:  [] 83  Resp:  [14-50] 22  SpO2:  [89 %-98 %] 98 %  BP: (111-153)/(57-92) 130/72     Weight: 90.7 kg (200 lb)  Body mass index is 28.7 kg/m².    Intake/Output - Last 3 Shifts       05/23 0700 - 05/24 0659 05/24 0700 - 05/25 0659 05/25 0700 - 05/26 0659    P.O.   100    I.V. (mL/kg) 4259 (47) 3156 (34.8)     IV Piggyback 500      Total Intake(mL/kg) 4759 (52.5) 3156 (34.8) 100 (1.1)    Urine (mL/kg/hr) 865 (0.4) 1350 (0.6) 75 (0.2)    Emesis/NG output       Drains 380 (0.2) 50 (0) 90 (0.3)    Stool 0 (0)      Blood 200 (0.1)      Total Output 1445 1400 165    Net +3314 +1756 -65           Stool Occurrence 0 x            Physical Exam   Constitutional: He is oriented to person, place, and time. No distress.   HENT:   Head: Normocephalic.   Eyes: Scleral icterus is present.   Cardiovascular: Normal rate and regular rhythm.    Pulmonary/Chest: Effort normal.   Abdominal: Soft. He exhibits distension.   Dressing C/D/I  FRANCISCO with serosang drainage   Musculoskeletal: Normal range of motion.   Neurological: He is alert and oriented to person, place, and time.   Skin: Skin is warm and dry. He is not diaphoretic.   Psychiatric: He has a normal mood and affect. His behavior is normal.       Significant Labs:  CBC:   Recent Labs  Lab 05/25/17  0346   WBC 17.26*   RBC 3.04*   HGB 8.9*   HCT 26.8*      MCV 88   MCH 29.3   MCHC 33.2     CMP:   Recent Labs  Lab 05/25/17  0346   *   CALCIUM 8.1*   ALBUMIN 2.1*   PROT 4.9*   *   K 3.5   CO2 25      BUN 10   CREATININE 0.6   ALKPHOS 311*   ALT 62*   AST 56*   BILITOT 5.2*         Assessment/Plan:     Hypomagnesemia    Replace prn        Hypoalbuminemia due to protein-calorie malnutrition    NPO for now, possible diet later this afternoon        Hypophosphatemia    Replace prn        Leukocytosis    Trending down, cont cipro, flagyl, vanc for intra-abdominal         Bacteriuria    On cipro        Tobacco abuse    Nicotine patch  in place        Hyperammonemia    Ammonia wnl yesterday, monitor        Alcohol abuse    Cont to monitor for signs of withdrawal          * Obstructive jaundice     S/p EUS/ERCP with AES with duct cleared, no stent placed  Cholecystectomy 5/23- cholecystitis  Tbili trending down, [facility]            Martha Hanna MD  General Surgery  [facility]

## 2017-05-25 NOTE — PLAN OF CARE
Problem: Physical Therapy Goal  Goal: Physical Therapy Goal  Goals to be met by: 17    Patient will increase functional independence with mobility by performin. Supine to sit with Modified [place] - not met  2. Sit to stand transfer with Modified [place] - not met  3. Gait  x 220 feet with Supervision  - not met    eval completed and goals appropriate. Ronda Vargas, PT 2017

## 2017-05-25 NOTE — PT/OT/SLP EVAL
Physical Therapy  Evaluation    Jimmy Pink   MRN: [id number]   Admitting Diagnosis: Obstructive jaundice    PT Received On: 05/25/17  PT Start Time: 0912     PT Stop Time: 0923    PT Total Time (min): 11 min       Billable Minutes:  Evaluation 11 min    Diagnosis: Obstructive jaundice  Pt was transferred from [facility]. Pt is s/p open cholecystectomy 5/23/17.    Past Medical History:   Diagnosis Date    Alcohol abuse     History of fracture of finger     Tobacco abuse       History reviewed. No pertinent surgical history.    Referring physician: Martha Hanna  Date referred to PT: 5/24/17    General Precautions: Standard, fall  Orthopedic Precautions:     Braces:         Do you have any cultural, spiritual, Confucianist conflicts, given your current situation?:  (none)    Patient History:  Lives With: facility resident (pt lives in homeless shelter.)  Living Arrangements: homeless (pt lives in homeless shelter.)  Equipment Currently Used at Home: none  DME owned (not currently used): none    Previous Level of Function:  Ambulation Skills: independent  Transfer Skills: independent    Subjective:  Communicated with nurse prior to session.    Chief Complaint: pt had no complaints during treatment.   Patient goals: to get better and be discharged.     Pain/Comfort  Pain Rating 1: 0/10  Pain Rating Post-Intervention 1: 0/10      Objective:   Patient found with: telemetry, pulse ox (continuous), blood pressure cuff, oxygen, FRANCISCO drain, izaguirre catheter, peripheral IV     Cognitive Exam:  Oriented to: Person, Place, Time and Situation    Follows Commands/attention: Follows multistep  commands  Communication: clear/fluent  Safety awareness/insight to disability: intact    Physical Exam:    Lower Extremity Range of Motion:  Right Lower Extremity: WFL  Left Lower Extremity: WFL    Lower Extremity Strength:  Right Lower Extremity: WFL  Left Lower Extremity: WFL       Functional Mobility:  Bed  Mobility:  Rolling/Turning Right: Stand by assistance (pt was very impulsive and had decreased safety awaresness. )  Supine to Sit: Stand by Assistance    Transfers:  Sit <> Stand Assistance: Contact Guard Assistance  Sit <> Stand Assistive Device: No Assistive Device    Gait:   Gait Distance: 10 ft  Assistance 1: Contact Guard Assistance  Gait Assistive Device: Hand held assist  Gait Pattern: 2-point gait      AM-PAC 6 CLICK MOBILITY  How much help from another person does this patient currently need?   1 = Unable, Total/Dependent Assistance  2 = A lot, Maximum/Moderate Assistance  3 = A little, Minimum/Contact Guard/Supervision  4 = None, Modified [place]/Independent    Turning over in bed (including adjusting bedclothes, sheets and blankets)?: 3  Sitting down on and standing up from a chair with arms (e.g., wheelchair, bedside commode, etc.): 3  Moving from lying on back to sitting on the side of the bed?: 3  Moving to and from a bed to a chair (including a wheelchair)?: 3  Need to walk in hospital room?: 3  Climbing 3-5 steps with a railing?: 2  Total Score: 17     AM-PAC Raw Score CMS G-Code Modifier Level of Impairment Assistance   6 % Total / Unable   7 - 9 CM 80 - 100% Maximal Assist   10 - 14 CL 60 - 80% Moderate Assist   15 - 19 CK 40 - 60% Moderate Assist   20 - 22 CJ 20 - 40% Minimal Assist   23 CI 1-20% SBA / CGA   24 CH 0% Independent/ Mod I     Patient left up in chair with all lines intact and call button in reach.    Assessment:   Jimmy Pink is a 56 y.o. male with a medical diagnosis of Obstructive jaundice and presents with decreased mobility, transfers and decreased distance ambulated. Pt will benefit from cont PT to address deficits and will be able to discharge home with no needs when medically stable. Pt will benefit from skilled PT 5x/wk to progress pt to Independent status. Pt is s/p open cholecystectomy 5/23/17.     Rehab identified problem list/impairments: Rehab identified  problem list/impairments: impaired functional mobilty, gait instability    Rehab potential is good.    Activity tolerance: Good    Discharge recommendations: Discharge Facility/Level Of Care Needs:  (no further PT will be needed.)     Barriers to discharge: Barriers to Discharge: None    Equipment recommendations: Equipment Needed After Discharge: none     GOALS:    Physical Therapy Goals        Problem: Physical Therapy Goal    Goal Priority Disciplines Outcome Goal Variances Interventions   Physical Therapy Goal     PT/OT, PT      Description:  Goals to be met by: 17    Patient will increase functional independence with mobility by performin. Supine to sit with Modified [place] - not met  2. Sit to stand transfer with Modified [place] - not met  3. Gait  x 220 feet with Supervision  - not met                      PLAN:    Patient to be seen 5 x/week to address the above listed problems via gait training, therapeutic activities  Plan of Care expires: 17  Plan of Care reviewed with: patient          Ronda ADITYA Vargas, PT  2017

## 2017-05-25 NOTE — SUBJECTIVE & OBJECTIVE
Interval History: NAEON. Pain controlled. No flatus.    Medications:  Continuous Infusions:   lactated ringers 125 mL/hr at 05/25/17 1000     Scheduled Meds:   ciprofloxacin  400 mg Intravenous Q12H    enoxaparin  40 mg Subcutaneous Daily    metronidazole  500 mg Intravenous Q8H    nicotine  1 patch Transdermal Daily    pantoprazole  40 mg Intravenous BID    propranolol  20 mg Oral BID    thiamine  100 mg Oral Daily    vancomycin (VANCOCIN) IVPB  15 mg/kg Intravenous Q12H     PRN Meds:dextrose 50%, dextrose 50%, glucagon (human recombinant), glucose, glucose, magnesium sulfate IVPB, magnesium sulfate IVPB, ondansetron, potassium chloride **AND** potassium chloride **AND** potassium chloride, sodium chloride 0.9%, sodium phosphate IVPB, sodium phosphate IVPB, sodium phosphate IVPB, tramadol     Review of patient's allergies indicates:  No Known Allergies  Objective:     Vital Signs (Most Recent):  Temp: 98 °F (36.7 °C) (05/25/17 0700)  Pulse: 83 (05/25/17 1000)  Resp: (!) 22 (05/25/17 1000)  BP: 130/72 (05/25/17 1000)  SpO2: 98 % (05/25/17 1000) Vital Signs (24h Range):  Temp:  [98 °F (36.7 °C)-98.5 °F (36.9 °C)] 98 °F (36.7 °C)  Pulse:  [] 83  Resp:  [14-50] 22  SpO2:  [89 %-98 %] 98 %  BP: (111-153)/(57-92) 130/72     Weight: 90.7 kg (200 lb)  Body mass index is 28.7 kg/m².    Intake/Output - Last 3 Shifts       05/23 0700 - 05/24 0659 05/24 0700 - 05/25 0659 05/25 0700 - 05/26 0659    P.O.   100    I.V. (mL/kg) 4259 (47) 3156 (34.8)     IV Piggyback 500      Total Intake(mL/kg) 4759 (52.5) 3156 (34.8) 100 (1.1)    Urine (mL/kg/hr) 865 (0.4) 1350 (0.6) 75 (0.2)    Emesis/NG output       Drains 380 (0.2) 50 (0) 90 (0.3)    Stool 0 (0)      Blood 200 (0.1)      Total Output 1445 1400 165    Net +3314 +1756 -65           Stool Occurrence 0 x            Physical Exam   Constitutional: He is oriented to person, place, and time. No distress.   HENT:   Head: Normocephalic.   Eyes: Scleral icterus is  present.   Cardiovascular: Normal rate and regular rhythm.    Pulmonary/Chest: Effort normal.   Abdominal: Soft. He exhibits distension.   Dressing C/D/I  FRANCISCO with serosang drainage   Musculoskeletal: Normal range of motion.   Neurological: He is alert and oriented to person, place, and time.   Skin: Skin is warm and dry. He is not diaphoretic.   Psychiatric: He has a normal mood and affect. His behavior is normal.       Significant Labs:  CBC:   Recent Labs  Lab 05/25/17  0346   WBC 17.26*   RBC 3.04*   HGB 8.9*   HCT 26.8*      MCV 88   MCH 29.3   MCHC 33.2     CMP:   Recent Labs  Lab 05/25/17  0346   *   CALCIUM 8.1*   ALBUMIN 2.1*   PROT 4.9*   *   K 3.5   CO2 25      BUN 10   CREATININE 0.6   ALKPHOS 311*   ALT 62*   AST 56*   BILITOT 5.2*

## 2017-05-25 NOTE — PROGRESS NOTES
Progress Note  Surgical Intensive Care    Admit Date: 5/20/2017  Post-operative Day: 2 Days Post-Op  Hospital Day: 6    SUBJECTIVE:     Follow-up For:  Procedure(s) (LRB):  CHOLECYSTECTOMY. Laproscopic converted to open (N/A)  CHOLECYSTECTOMY-LAPAROSCOPIC (N/A)    History of Present Illness:  Patient is a 56 y.o. male with h/o alcohol and tobacco abuse, cirrhosis and portal hypertension, currently living in a homeless shelter, who was transferred from [place] due to obstructive jaundice.  He was noted to have orange urine and fatigue, and was sent to ED.  Labs demonstrated t bili of 21.5, alk phos 697, INR of 1.3, and wbc of 18.  Labs from 2 yrs ago all wnl.  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  He states he has lost some weight. Denies abd pain, fevers, chills, itching. Reports he stopped drinking a few weeks ago.       No major medical or surgical history. No family history of malignancy. Underwent and ERCP with stone clearance on 5/22/17 and open cholecystectomy on 5/23/17.      Transported to SICU extubated and hemodynamically stable s/p open cholecystectomy.    Interval history:  No acute events overnight. No overt s/s of etoh w/d overnight.     Continuous Infusions:   lactated ringers 125 mL/hr at 05/25/17 0600     Scheduled Meds:   ciprofloxacin  400 mg Intravenous Q12H    enoxaparin  40 mg Subcutaneous Daily    metronidazole  500 mg Intravenous Q8H    nicotine  1 patch Transdermal Daily    pantoprazole  40 mg Intravenous BID    thiamine  100 mg Oral Daily    vancomycin (VANCOCIN) IVPB  15 mg/kg Intravenous Q12H     PRN Meds:dextrose 50%, dextrose 50%, glucagon (human recombinant), glucose, glucose, magnesium sulfate IVPB, magnesium sulfate IVPB, ondansetron, potassium chloride **AND** potassium chloride **AND** potassium chloride, sodium chloride 0.9%, sodium phosphate IVPB, sodium phosphate IVPB, sodium  phosphate IVPB, tramadol    Review of patient's allergies indicates:  No Known Allergies    OBJECTIVE:     Vital Signs (Most Recent)  Temp: 98 °F (36.7 °C) (05/25/17 0300)  Pulse: (!) 112 (05/25/17 0600)  Resp: (!) 22 (05/25/17 0600)  BP: (!) 111/57 (05/25/17 0600)  SpO2: (!) 89 % (05/25/17 0600)    Vital Signs Range (Last 24H):  Temp:  [98 °F (36.7 °C)-98.5 °F (36.9 °C)]   Pulse:  []   Resp:  [14-50]   BP: (111-153)/(57-92)   SpO2:  [89 %-98 %]     I & O (Last 24H):    Intake/Output Summary (Last 24 hours) at 05/25/17 0709  Last data filed at 05/25/17 0600   Gross per 24 hour   Intake             3156 ml   Output             1320 ml   Net             1836 ml     Ventilator Data (Last 24H):          Hemodynamic Parameters (Last 24H):       Physical Exam:  General: appears older than stated age, NAD  HENT: NCAT, PERRL  Lungs:  normal respiratory effort, CTAB  Cardiovascular: RRR, no murmur  Extremities: no cyanosis or edema, or clubbing. Pulses: 2+ and symmetric.  Abdomen: Abdomen: soft, non-distended, subcostal incision with surgical dressing in place.  Skin: Skin color, texture, turgor normal. No rashes or lesions  Neurologic: Normal strength and tone. No focal numbness or weakness      Lines/Drains:       Peripheral IV - Single Lumen 05/21/17 1810 Left Antecubital (Active)   Site Assessment Clean;Dry;Intact;No redness;No swelling 5/24/2017  3:00 AM   Line Status Flushed;Saline locked 5/24/2017  3:00 AM   Dressing Status Clean;Dry;Intact 5/24/2017  3:00 AM   Dressing Intervention Dressing reinforced 5/22/2017  8:55 AM   Dressing Change Due 05/25/17 5/24/2017  3:00 AM   Site Change Due 05/25/17 5/22/2017  9:12 PM   Reason Not Rotated Not due 5/24/2017  3:00 AM   Number of days: 2            Peripheral IV - Single Lumen 05/22/17 0420 Right Forearm (Active)   Site Assessment Clean;Dry;Intact;No redness;No swelling 5/24/2017  3:00 AM   Line Status Infusing 5/24/2017  3:00 AM   Dressing Status Clean;Dry;Intact  "5/24/2017  3:00 AM   Dressing Change Due 05/26/17 5/22/2017  9:12 PM   Site Change Due 05/26/17 5/23/2017  7:00 PM   Reason Not Rotated Not due 5/24/2017  3:00 AM   Number of days: 2            Peripheral IV - Single Lumen 05/23/17 1156 Left Hand (Active)   Site Assessment Clean;Dry;Intact;No redness;No swelling 5/24/2017  3:00 AM   Line Status Infusing 5/24/2017  3:00 AM   Dressing Status Clean;Dry;Intact 5/24/2017  3:00 AM   Site Change Due 05/27/17 5/23/2017  7:00 PM   Reason Not Rotated Not due 5/24/2017  3:00 AM   Number of days: 0            Closed/Suction Drain 05/23/17 1226 Right RUQ Bulb 19 Fr. (Active)   Site Description Unable to view 5/24/2017  3:00 AM   Dressing Type Gauze 5/24/2017  3:00 AM   Dressing Status Clean;Dry;Intact 5/24/2017  3:00 AM   Drainage Brown;Dark red 5/24/2017  3:00 AM   Status To bulb suction 5/24/2017  3:00 AM   Output (mL) 40 mL 5/24/2017  6:00 AM   Number of days: 0            Urethral Catheter 05/23/17 1200 Latex (Active)   Site Assessment Clean;Intact 5/24/2017  3:00 AM   Collection Container Urimeter 5/24/2017  3:00 AM   Securement Method secured to top of thigh w/ adhesive device 5/24/2017  3:00 AM   Catheter Care Performed yes 5/24/2017  3:00 AM   Reason for Continuing Urinary Catheterization Post operative 5/24/2017  3:00 AM   CAUTI Prevention Bundle StatLock in place w 1" slack 5/23/2017  7:00 PM   Output (mL) 30 mL 5/24/2017  6:00 AM   Number of days: 0       Laboratory (Last 24H):  CBC:    Recent Labs  Lab 05/25/17  0346   WBC 17.26*   HGB 8.9*   HCT 26.8*        CMP:    Recent Labs  Lab 05/25/17  0346   CALCIUM 8.1*   ALBUMIN 2.1*   PROT 4.9*   *   K 3.5   CO2 25      BUN 10   CREATININE 0.6   ALKPHOS 311*   ALT 62*   AST 56*   BILITOT 5.2*       Recent Labs  Lab 05/25/17  0346   INR 1.1     No results for input(s): PH, PCO2, PO2, HCO3, POCSATURATED, BE in the last 24 hours.      ASSESSMENT/PLAN:   Jimmy Pink is a 56 y.o. male with history of etoh " abuse, homeless, initially admitted for work up of obstructive jaundice and CT evidence of periampullary mass with intra- and extra-hepatic biliary dilation, admitted to SICU on 5/23 s/p open cholecystectomy with drainage of pus, HDS and extubated in OR.    Plan:  Neuro:   - No acute issues  - EtOH w/d unlikely at this point; pt admitted since 5/20 without overt s/s of w/d to this point.  - prn tramadol for pain    Pulmonary:   - No acute issues, satting well on NC    Cardiac:  - HDS  - Lactate downtrended    Renal:   - Trend BUN/Cr  - Adequate UOP  - Continue to monitor    Infectious Disease:   - WBC downtrending, 17 this am  - Trend CBC  - Cipro, flagyl, vanc per primary    Hematology/Oncology:  - H/H stable postop  - Thrombocytosis resolved, plts 300 this am  - Pt with portal vein thrombosis; previously only on prophylactic dose lovenox, resumed s/p yaritza    Endocrine:  - No acute issues  - Accuchecks while NPO    Fluids/Electrolytes/Nutrition/GI:   - mIVF LR at 125/h  - Thiamine daily  - Replete electrolytes prn  - NPO currently, can likely advance to clears this am  - Obstructive jaundice - Alk phos downtrending, T bili downtrending s/p yaritza    Dispo: Stepdown per primary    Suad Rosas MD  SICU PGY-2

## 2017-05-26 LAB
ALBUMIN SERPL BCP-MCNC: 1.8 G/DL
ALP SERPL-CCNC: 238 U/L
ALT SERPL W/O P-5'-P-CCNC: 43 U/L
AMMONIA PLAS-SCNC: 33 UMOL/L
ANION GAP SERPL CALC-SCNC: 8 MMOL/L
AST SERPL-CCNC: 42 U/L
BASOPHILS # BLD AUTO: 0.02 K/UL
BASOPHILS NFR BLD: 0.1 %
BILIRUB SERPL-MCNC: 3.9 MG/DL
BUN SERPL-MCNC: 10 MG/DL
CALCIUM SERPL-MCNC: 7.5 MG/DL
CHLORIDE SERPL-SCNC: 100 MMOL/L
CO2 SERPL-SCNC: 24 MMOL/L
CREAT SERPL-MCNC: 0.6 MG/DL
DIFFERENTIAL METHOD: ABNORMAL
EOSINOPHIL # BLD AUTO: 0.1 K/UL
EOSINOPHIL NFR BLD: 0.3 %
ERYTHROCYTE [DISTWIDTH] IN BLOOD BY AUTOMATED COUNT: 17.2 %
EST. GFR  (AFRICAN AMERICAN): >60 ML/MIN/1.73 M^2
EST. GFR  (NON AFRICAN AMERICAN): >60 ML/MIN/1.73 M^2
GLUCOSE SERPL-MCNC: 90 MG/DL
HCT VFR BLD AUTO: 28.4 %
HGB BLD-MCNC: 9.4 G/DL
INR PPP: 1.2
LACTATE SERPL-SCNC: 1.7 MMOL/L
LACTATE SERPL-SCNC: 3.9 MMOL/L
LIPOPROTEIN (A): 6 MG/DL (ref 0–30)
LYMPHOCYTES # BLD AUTO: 2.1 K/UL
LYMPHOCYTES NFR BLD: 11.7 %
MAGNESIUM SERPL-MCNC: 1.3 MG/DL
MCH RBC QN AUTO: 29.5 PG
MCHC RBC AUTO-ENTMCNC: 33.1 %
MCV RBC AUTO: 89 FL
MONOCYTES # BLD AUTO: 2.1 K/UL
MONOCYTES NFR BLD: 11.5 %
NEUTROPHILS # BLD AUTO: 13.8 K/UL
NEUTROPHILS NFR BLD: 76.4 %
PHOSPHATE SERPL-MCNC: 2.1 MG/DL
PLATELET # BLD AUTO: 366 K/UL
PMV BLD AUTO: 9.9 FL
POTASSIUM SERPL-SCNC: 4 MMOL/L
PROT SERPL-MCNC: 4.3 G/DL
PROTHROMBIN TIME: 12.8 SEC
RBC # BLD AUTO: 3.19 M/UL
SODIUM SERPL-SCNC: 132 MMOL/L
WBC # BLD AUTO: 18.25 K/UL

## 2017-05-26 PROCEDURE — 82140 ASSAY OF AMMONIA: CPT

## 2017-05-26 PROCEDURE — 63600175 PHARM REV CODE 636 W HCPCS

## 2017-05-26 PROCEDURE — 85610 PROTHROMBIN TIME: CPT

## 2017-05-26 PROCEDURE — 25000003 PHARM REV CODE 250: Performed by: STUDENT IN AN ORGANIZED HEALTH CARE EDUCATION/TRAINING PROGRAM

## 2017-05-26 PROCEDURE — 94640 AIRWAY INHALATION TREATMENT: CPT

## 2017-05-26 PROCEDURE — 84100 ASSAY OF PHOSPHORUS: CPT

## 2017-05-26 PROCEDURE — 25000003 PHARM REV CODE 250: Performed by: SURGERY

## 2017-05-26 PROCEDURE — 36415 COLL VENOUS BLD VENIPUNCTURE: CPT

## 2017-05-26 PROCEDURE — 63600175 PHARM REV CODE 636 W HCPCS: Performed by: SURGERY

## 2017-05-26 PROCEDURE — 80053 COMPREHEN METABOLIC PANEL: CPT

## 2017-05-26 PROCEDURE — 20600001 HC STEP DOWN PRIVATE ROOM

## 2017-05-26 PROCEDURE — 25000242 PHARM REV CODE 250 ALT 637 W/ HCPCS: Performed by: STUDENT IN AN ORGANIZED HEALTH CARE EDUCATION/TRAINING PROGRAM

## 2017-05-26 PROCEDURE — 97116 GAIT TRAINING THERAPY: CPT

## 2017-05-26 PROCEDURE — C1751 CATH, INF, PER/CENT/MIDLINE: HCPCS

## 2017-05-26 PROCEDURE — 85025 COMPLETE CBC W/AUTO DIFF WBC: CPT

## 2017-05-26 PROCEDURE — C9113 INJ PANTOPRAZOLE SODIUM, VIA: HCPCS | Performed by: SURGERY

## 2017-05-26 PROCEDURE — 25000003 PHARM REV CODE 250: Performed by: INTERNAL MEDICINE

## 2017-05-26 PROCEDURE — 83735 ASSAY OF MAGNESIUM: CPT

## 2017-05-26 PROCEDURE — 83605 ASSAY OF LACTIC ACID: CPT

## 2017-05-26 PROCEDURE — 76937 US GUIDE VASCULAR ACCESS: CPT

## 2017-05-26 PROCEDURE — 36569 INSJ PICC 5 YR+ W/O IMAGING: CPT

## 2017-05-26 RX ORDER — IPRATROPIUM BROMIDE AND ALBUTEROL SULFATE 2.5; .5 MG/3ML; MG/3ML
3 SOLUTION RESPIRATORY (INHALATION) EVERY 4 HOURS
Status: DISCONTINUED | OUTPATIENT
Start: 2017-05-26 | End: 2017-05-29 | Stop reason: HOSPADM

## 2017-05-26 RX ADMIN — VANCOMYCIN HYDROCHLORIDE 1250 MG: 100 INJECTION, POWDER, LYOPHILIZED, FOR SOLUTION INTRAVENOUS at 12:05

## 2017-05-26 RX ADMIN — PANTOPRAZOLE SODIUM 40 MG: 40 INJECTION, POWDER, FOR SOLUTION INTRAVENOUS at 09:05

## 2017-05-26 RX ADMIN — PROPRANOLOL HYDROCHLORIDE 20 MG: 10 TABLET ORAL at 09:05

## 2017-05-26 RX ADMIN — ENOXAPARIN SODIUM 40 MG: 100 INJECTION SUBCUTANEOUS at 04:05

## 2017-05-26 RX ADMIN — MAGNESIUM SULFATE HEPTAHYDRATE 3 G: 500 INJECTION, SOLUTION INTRAMUSCULAR; INTRAVENOUS at 07:05

## 2017-05-26 RX ADMIN — SODIUM CHLORIDE, SODIUM LACTATE, POTASSIUM CHLORIDE, AND CALCIUM CHLORIDE 1000 ML: .6; .31; .03; .02 INJECTION, SOLUTION INTRAVENOUS at 07:05

## 2017-05-26 RX ADMIN — METRONIDAZOLE 500 MG: 500 INJECTION, SOLUTION INTRAVENOUS at 03:05

## 2017-05-26 RX ADMIN — METRONIDAZOLE 500 MG: 500 INJECTION, SOLUTION INTRAVENOUS at 10:05

## 2017-05-26 RX ADMIN — THIAMINE HCL TAB 100 MG 100 MG: 100 TAB at 09:05

## 2017-05-26 RX ADMIN — IPRATROPIUM BROMIDE AND ALBUTEROL SULFATE 3 ML: .5; 3 SOLUTION RESPIRATORY (INHALATION) at 04:05

## 2017-05-26 RX ADMIN — CIPROFLOXACIN 400 MG: 2 INJECTION, SOLUTION INTRAVENOUS at 01:05

## 2017-05-26 RX ADMIN — CIPROFLOXACIN 400 MG: 2 INJECTION, SOLUTION INTRAVENOUS at 04:05

## 2017-05-26 RX ADMIN — PANTOPRAZOLE SODIUM 40 MG: 40 INJECTION, POWDER, FOR SOLUTION INTRAVENOUS at 10:05

## 2017-05-26 RX ADMIN — SODIUM PHOSPHATE, MONOBASIC, MONOHYDRATE 30 MMOL: 276; 142 INJECTION, SOLUTION INTRAVENOUS at 12:05

## 2017-05-26 RX ADMIN — IPRATROPIUM BROMIDE AND ALBUTEROL SULFATE 3 ML: .5; 3 SOLUTION RESPIRATORY (INHALATION) at 07:05

## 2017-05-26 RX ADMIN — METRONIDAZOLE 500 MG: 500 INJECTION, SOLUTION INTRAVENOUS at 06:05

## 2017-05-26 RX ADMIN — NICOTINE 1 PATCH: 21 PATCH, EXTENDED RELEASE TRANSDERMAL at 09:05

## 2017-05-26 RX ADMIN — IPRATROPIUM BROMIDE AND ALBUTEROL SULFATE 3 ML: .5; 3 SOLUTION RESPIRATORY (INHALATION) at 12:05

## 2017-05-26 NOTE — PT/OT/SLP EVAL
Occupational Therapy  Evaluation    Jimmy Pink   MRN: [id number]   Admitting Diagnosis: Obstructive jaundice    OT Date of Treatment: 05/25/17   OT Start Time: 0907  OT Stop Time: 0919  OT Total Time (min): 12 min    Billable Minutes:  Evaluation 12    Diagnosis: Obstructive jaundice   S/p ERCP with stone clearance 5/22 and open yaritza 5/23    Past Medical History:   Diagnosis Date    Alcohol abuse     History of fracture of finger     Tobacco abuse       History reviewed. No pertinent surgical history.    Referring physician: Jacques  Date referred to OT: 5/24/17    General Precautions: Standard, fall  Orthopedic Precautions:    Braces:      Do you have any cultural, spiritual, Quaker conflicts, given your current situation?: none reported     Patient History:  Living Environment  Lives With: facility resident (pt lives in a homeless shelter in [place], [place])  Living Arrangements: homeless  Living Environment Comment: Pt reports (I) PTA. Pt has sister that lives in [place], but pt reports he will not be able to go there  Equipment Currently Used at Home: none    Prior level of function:   Bed Mobility/Transfers: independent  Grooming: independent  Bathing: independent  Upper Body Dressing: independent  Lower Body Dressing: independent  Toileting: independent  Home Management Skills: independent        Dominant hand: right    Subjective:  Communicated with RN prior to session.    Chief Complaint: no complaints  Patient/Family stated goals: to get better    Pain/Comfort  Pain Rating 1: 0/10    Objective:  Patient found with: pulse ox (continuous), peripheral IV, FRANCISCO drain, blood pressure cuff, telemetry, izaguirre catheter    Cognitive Exam:  Pt is A&O x 3 following all commands    Physical Exam:  Postural examination/scapula alignment: No postural abnormalities identified  Skin integrity: Visible skin intact  Edema: None noted     Sensation:   Intact    Upper Extremity Range of Motion:  Right Upper  "Extremity: WFL  Left Upper Extremity: WFL    Upper Extremity Strength:  Right Upper Extremity: WNL  Left Upper Extremity: WNL   Strength: Good    Fine motor coordination:   Intact    Gross motor coordination: WFL    Functional Mobility:  Bed Mobility:  Rolling/Turning to Left: Stand by assistance  Rolling/Turning Right: Stand by assistance  Scooting/Bridging: Stand by Assistance  Supine to Sit: Stand by Assistance    Transfers:  Sit <> Stand Assistance: Contact Guard Assistance  Sit <> Stand Assistive Device: No Assistive Device  Bed <> Chair Technique: Stand Pivot  Bed <> Chair Transfer Assistance: Contact Guard Assistance    Functional Ambulation: CGA    Activities of Daily Living:  Feeding Level of Assistance: Independent  Feeding adaptive equipment:   UE Dressing Level of Assistance: Stand by assistance  UE adaptive equipment:   LE Dressing Level of Assistance: Stand by assistance  LE adaptive equipment:   Grooming Position: EOB  Grooming Level of Assistance: Supervision    AM-PAC 6 CLICK ADL  How much help from another person does this patient currently need?  1 = Unable, Total/Dependent Assistance  2 = A lot, Maximum/Moderate Assistance  3 = A little, Minimum/Contact Guard/Supervision  4 = None, Modified [place]/Independent    Putting on and taking off regular lower body clothing? : 3  Bathing (including washing, rinsing, drying)?: 3  Toileting, which includes using toilet, bedpan, or urinal? : 3  Putting on and taking off regular upper body clothing?: 3  Taking care of personal grooming such as brushing teeth?: 4  Eating meals?: 4  Total Score: 20    AM-PAC Raw Score CMS "G-Code Modifier Level of Impairment Assistance   6 % Total / Unable   7 - 9 CM 80 - 100% Maximal Assist   10-14 CL 60 - 80% Moderate Assist   15 - 19 CK 40 - 60% Moderate Assist   20 - 22 CJ 20 - 40% Minimal Assist   23 CI 1-20% SBA / CGA   24 CH 0% Independent/ Mod I       Patient left up in chair with all lines intact, call " button in reach and RN notified    Assessment:  Jimmy Pink is a 56 y.o. male with a medical diagnosis of Obstructive jaundice and presents with no need for skilled OT services.    Rehab identified problem list/impairments: Rehab identified problem list/impairments: gait instability    Rehab potential is good.    Activity tolerance: Good    Discharge recommendations: Discharge Facility/Level Of Care Needs:  (return to homeless shelter)     Barriers to discharge: Barriers to Discharge: None    Equipment recommendations: none     GOALS:    Occupational Therapy Goals     Not on file          Multidisciplinary Problems (Resolved)        Problem: Occupational Therapy Goal    Goal Priority Disciplines Outcome Interventions   Occupational Therapy Goal   (Resolved)     OT, PT/OT Outcome(s) achieved                    PLAN:  Patient to be seen   to address the above listed problems via  (D/c OT)  Plan of Care expires:    Plan of Care reviewed with: patient         MICHAEL Farley  05/25/2017

## 2017-05-26 NOTE — PROGRESS NOTES
Introduced self, report called to Renzo MAYO RN accepted report.  Pt bathed and shaved.  Up to [phone] with bedside sitter.

## 2017-05-26 NOTE — TREATMENT PLAN
[facility] Follow-up Note    LFT's improving.     Will arrange for EGD in 8 weeks to assess for healing of esophagitis.     Recommend hematology input on PVT. Consider chronic liver disease investigation.     Thank you for allowing us to participate in the care of Jimmy Pink. Please do not hesitate to reconsult us with questions.    Diana Watson MD PGY-6  Gastroenterology Fellow  Pager# [phone]

## 2017-05-26 NOTE — CONSULTS
Midline placed in right brachial vein, 18gx10 catheter length. Lot#[id number]. Used real time ultrasound. Image recorded and saved.

## 2017-05-26 NOTE — PROGRESS NOTES
[facility]  General Surgery  Progress Note    Subjective:     History of Present Illness:  57 y/o male with h/o alcohol and tobacco abuse, currently living in a homeless shelter, who was transferred from [place] due to obstructive jaundice.  He was noted to have orange urine and fatigue, and was sent to ED.  Labs demonstrated t bili of 21.5, alk phos 697, INR of 1.3, and wbc of 18.  Labs from 2 yrs ago all wnl. He states he has lost some weight.      He specifically denies abd pain, fevers, chills, itching.      He states he stopped drinking a few weeks ago.      No major medical or surgical history. No family history of malignancy.     Post-Op Info:  Procedure(s) (LRB):  CHOLECYSTECTOMY. Laproscopic converted to open (N/A)  CHOLECYSTECTOMY-LAPAROSCOPIC (N/A)   3 Days Post-Op     Interval History: Stepped down from ICU. Sitter overnight for confusion. No nausea.     Medications:  Continuous Infusions:   lactated ringers 125 mL/hr at 05/25/17 1800     Scheduled Meds:   ciprofloxacin  400 mg Intravenous Q12H    enoxaparin  40 mg Subcutaneous Daily    lactated ringers  1,000 mL Intravenous Once    magnesium sulfate IVPB  3 g Intravenous Once    metronidazole  500 mg Intravenous Q8H    nicotine  1 patch Transdermal Daily    pantoprazole  40 mg Intravenous BID    propranolol  20 mg Oral BID    sodium phosphate IVPB  30 mmol Intravenous Once    thiamine  100 mg Oral Daily    vancomycin (VANCOCIN) IVPB  15 mg/kg Intravenous Q12H     PRN Meds:dextrose 50%, dextrose 50%, glucagon (human recombinant), glucose, glucose, ondansetron, sodium chloride 0.9%, tramadol     Review of patient's allergies indicates:  No Known Allergies  Objective:     Vital Signs (Most Recent):  Temp: 97.4 °F (36.3 °C) (05/26/17 0347)  Pulse: 78 (05/26/17 0347)  Resp: 20 (05/26/17 0347)  BP: (!) 110/55 (05/26/17 0347)  SpO2: (!) 94 % (05/26/17 0347) Vital Signs (24h Range):  Temp:  [97.4 °F (36.3 °C)-98.5 °F (36.9  °C)] 97.4 °F (36.3 °C)  Pulse:  [] 78  Resp:  [17-30] 20  SpO2:  [87 %-100 %] 94 %  BP: (110-131)/(55-75) 110/55     Weight: 90.7 kg (200 lb)  Body mass index is 28.7 kg/m².    Intake/Output - Last 3 Shifts       05/24 0700 - 05/25 0659 05/25 0700 - 05/26 0659 05/26 0700 - 05/27 0659    P.O.  150     I.V. (mL/kg) 3156 (34.8) 2340 (25.8)     IV Piggyback       Total Intake(mL/kg) 3156 (34.8) 2490 (27.5)     Urine (mL/kg/hr) 1350 (0.6) 430 (0.2)     Drains 50 (0) 470 (0.2)     Stool  0 (0)     Blood       Total Output 1400 900      Net +1756 +1590             Urine Occurrence  1 x     Stool Occurrence  2 x           Physical Exam   Constitutional: He is oriented to person, place, and time. No distress.   HENT:   Head: Normocephalic.   Eyes: Scleral icterus is present.   Cardiovascular: Normal rate and regular rhythm.    Pulmonary/Chest: Effort normal.   Abdominal: Soft. He exhibits distension.   Dressing C/D/I  FRANCISCO with serosang drainage   Musculoskeletal: Normal range of motion.   Neurological: He is alert and oriented to person, place, and time.   Skin: Skin is warm and dry. He is not diaphoretic.   Psychiatric: He has a normal mood and affect. His behavior is normal.       Significant Labs:  CBC:   Recent Labs  Lab 05/26/17  0505   WBC 18.25*   RBC 3.19*   HGB 9.4*   HCT 28.4*   *   MCV 89   MCH 29.5   MCHC 33.1     CMP:   Recent Labs  Lab 05/26/17  0505   GLU 90   CALCIUM 7.5*   ALBUMIN 1.8*   PROT 4.3*   *   K 4.0   CO2 24      BUN 10   CREATININE 0.6   ALKPHOS 238*   ALT 43   AST 42*   BILITOT 3.9*           Assessment/Plan:     Hypomagnesemia    Replace prn        Hypoalbuminemia due to protein-calorie malnutrition    Start clears today        Hypophosphatemia    Replace prn        Leukocytosis    Up slightly today, cont cipro, flagyl, vanc for intra-abdominal         Bacteriuria    On cipro        Tobacco abuse    Nicotine patch in place        Hyperammonemia    Ammonia wnl previously,  will reorder today        Alcohol abuse    Cont to monitor for signs of withdrawal          * Obstructive jaundice    S/p EUS/ERCP with [facility] with duct cleared, no stent placed  Cholecystectomy 5/23- cholecystitis  Tbili trending down, [facility]            Martha Hanna MD  General Surgery  [facility]

## 2017-05-26 NOTE — PLAN OF CARE
Problem: Physical Therapy Goal  Goal: Physical Therapy Goal  Goals to be met by: 17    Patient will increase functional independence with mobility by performin. Supine to sit with Modified [place] - not met  2. Sit to stand transfer with Modified [place] - not met  3. Gait  x 220 feet with Supervision  - not met     Pt progressing towards goals. continue with PT POC.Goals remain appropriate.    Dewayne Schilling PTA  2017

## 2017-05-26 NOTE — PLAN OF CARE
"Spoke to Dr. Hanna informing her of CM needing to know discharge prescriptions for cost as patient's Caretaker, Erika SARAH [phone], at [facility] will pay cash for them. She reports patient will only have one prescription, Propranolol, & it is on the chart.    Called [facility], spoke to Unique ARTEMIO Nahun, asking for a price on Propranolol prescription;gave her the information;price: $69.35. CM called Erika, patient's caretaker at [facility], & informed of above, as well as:1. [facility] hours: Sat 10 Am-4:00 PM, closed Sunday., 2. Patient may discharge over weekend pending medical stability. She verbalized her understanding & states,"I would like to get prescription filled elsewhere. I will pick him up & bring him here when he is ready. Can you tell patient we have been talking to one another so he does not worry. I spoke to his nurse today, she said he probably wouldn't be discharged this weekend." CM assured her I would see & tell patient. Floor nurse, rCisthian, updated.     CM visited patient. Transferred from ICU, hospital sitter provided & at , as floor nurse, Cristhian, states he will get out of bed & wander off. Informed patient CM has been in contact w/Angelia, [facility]' caretaker & informed of above. He verbalized his understanding.     BERTA RIBEIRO, informed in morning huddle, patient will discharge to [facility], w/the caretaker picking him up & paying for his prescription(s). CM will continue to follow.   "

## 2017-05-26 NOTE — SUBJECTIVE & OBJECTIVE
Interval History: Stepped down from ICU. Sitter overnight for confusion. No nausea.     Medications:  Continuous Infusions:   lactated ringers 125 mL/hr at 05/25/17 1800     Scheduled Meds:   ciprofloxacin  400 mg Intravenous Q12H    enoxaparin  40 mg Subcutaneous Daily    lactated ringers  1,000 mL Intravenous Once    magnesium sulfate IVPB  3 g Intravenous Once    metronidazole  500 mg Intravenous Q8H    nicotine  1 patch Transdermal Daily    pantoprazole  40 mg Intravenous BID    propranolol  20 mg Oral BID    sodium phosphate IVPB  30 mmol Intravenous Once    thiamine  100 mg Oral Daily    vancomycin (VANCOCIN) IVPB  15 mg/kg Intravenous Q12H     PRN Meds:dextrose 50%, dextrose 50%, glucagon (human recombinant), glucose, glucose, ondansetron, sodium chloride 0.9%, tramadol     Review of patient's allergies indicates:  No Known Allergies  Objective:     Vital Signs (Most Recent):  Temp: 97.4 °F (36.3 °C) (05/26/17 0347)  Pulse: 78 (05/26/17 0347)  Resp: 20 (05/26/17 0347)  BP: (!) 110/55 (05/26/17 0347)  SpO2: (!) 94 % (05/26/17 0347) Vital Signs (24h Range):  Temp:  [97.4 °F (36.3 °C)-98.5 °F (36.9 °C)] 97.4 °F (36.3 °C)  Pulse:  [] 78  Resp:  [17-30] 20  SpO2:  [87 %-100 %] 94 %  BP: (110-131)/(55-75) 110/55     Weight: 90.7 kg (200 lb)  Body mass index is 28.7 kg/m².    Intake/Output - Last 3 Shifts       05/24 0700 - 05/25 0659 05/25 0700 - 05/26 0659 05/26 0700 - 05/27 0659    P.O.  150     I.V. (mL/kg) 3156 (34.8) 2340 (25.8)     IV Piggyback       Total Intake(mL/kg) 3156 (34.8) 2490 (27.5)     Urine (mL/kg/hr) 1350 (0.6) 430 (0.2)     Drains 50 (0) 470 (0.2)     Stool  0 (0)     Blood       Total Output 1400 900      Net +1756 +1590             Urine Occurrence  1 x     Stool Occurrence  2 x           Physical Exam   Constitutional: He is oriented to person, place, and time. No distress.   HENT:   Head: Normocephalic.   Eyes: Scleral icterus is present.   Cardiovascular: Normal rate and  regular rhythm.    Pulmonary/Chest: Effort normal.   Abdominal: Soft. He exhibits distension.   Dressing C/D/I  FRANCISCO with serosang drainage   Musculoskeletal: Normal range of motion.   Neurological: He is alert and oriented to person, place, and time.   Skin: Skin is warm and dry. He is not diaphoretic.   Psychiatric: He has a normal mood and affect. His behavior is normal.       Significant Labs:  CBC:   Recent Labs  Lab 05/26/17  0505   WBC 18.25*   RBC 3.19*   HGB 9.4*   HCT 28.4*   *   MCV 89   MCH 29.5   MCHC 33.1     CMP:   Recent Labs  Lab 05/26/17  0505   GLU 90   CALCIUM 7.5*   ALBUMIN 1.8*   PROT 4.3*   *   K 4.0   CO2 24      BUN 10   CREATININE 0.6   ALKPHOS 238*   ALT 43   AST 42*   BILITOT 3.9*

## 2017-05-26 NOTE — PROGRESS NOTES
Notified Dr. Langley of patient's o2 sats 86% on room air, new orders being placed.  Patient is 96% on 3L o2 per NC.

## 2017-05-26 NOTE — PT/OT/SLP PROGRESS
Physical Therapy  Treatment    Jimmy Pink   MRN: [id number]   Admitting Diagnosis: Obstructive jaundice    PT Received On: 05/26/17  PT Start Time: 0753     PT Stop Time: 0816    PT Total Time (min): 23 min       Billable Minutes:  Gait [id number]    Treatment Type: Treatment  PT/PTA: PTA     PTA Visit Number: 1       General Precautions: Standard, fall  Orthopedic Precautions:     Braces:      Do you have any cultural, spiritual, Buddhist conflicts, given your current situation?:  (none)    Subjective:  Communicated with RN prior to session.  It feels good to walk    Pain/Comfort  Pain Rating 1: 0/10  Pain Rating Post-Intervention 1: 0/10    Objective:   Patient found with: peripheral IV, telemetry, pulse ox (continuous) (sitter present)    Functional Mobility:  Bed Mobility:   Rolling/Turning to Left: Stand by assistance  Supine to Sit: Stand by Assistance  Sit to Supine: Stand by Assistance    Transfers:  Sit <> Stand Assistance: Contact Guard Assistance, Stand By Assistance  Sit <> Stand Assistive Device: No Assistive Device    Gait:   Gait Distance: 160 ft x 2 with 1 seated rest break between trials  Assistance 1: Contact Guard Assistance, Stand by Assistance  Gait Assistive Device: No device  Gait Pattern: 2-point gait  Gait Deviation(s): forward lean, decreased step length      Therapeutic Activities and Exercises:   Discussed pt progress, safety, importance of OOB mobility and sitting in chair      White board updated   Donned an extra gown and gripping socks   Pt encouraged to ambulate in hallways 3x/day with nursing or family assistance to improve endurance.       AM-PAC 6 CLICK MOBILITY  How much help from another person does this patient currently need?   1 = Unable, Total/Dependent Assistance  2 = A lot, Maximum/Moderate Assistance  3 = A little, Minimum/Contact Guard/Supervision  4 = None, Modified [place]/Independent    Turning over in bed (including adjusting bedclothes, sheets and  blankets)?: 3  Sitting down on and standing up from a chair with arms (e.g., wheelchair, bedside commode, etc.): 3  Moving from lying on back to sitting on the side of the bed?: 3  Moving to and from a bed to a chair (including a wheelchair)?: 3  Need to walk in hospital room?: 3  Climbing 3-5 steps with a railing?: 2  Total Score: 17    AM-PAC Raw Score CMS G-Code Modifier Level of Impairment Assistance   6 % Total / Unable   7 - 9 CM 80 - 100% Maximal Assist   10 - 14 CL 60 - 80% Moderate Assist   15 - 19 CK 40 - 60% Moderate Assist   20 - 22 CJ 20 - 40% Minimal Assist   23 CI 1-20% SBA / CGA   24 CH 0% Independent/ Mod I     Patient left supine with all lines intact, call button in reach, nsg notified and sitter present.    Assessment:  Jimmy Pink is a 56 y.o. male with a medical diagnosis of Obstructive jaundice and presents with   continued deficits as listed below. Pt Progressing with PT Intervention. Pt Progressing with increase gait distance. Pt would continue to benefit from skilled PT to address overall functional mobility and goals. Goals remain appropriate.    Rehab identified problem list/impairments: Rehab identified problem list/impairments: gait instability, impaired balance    Rehab potential is good.    Activity tolerance: Good    Discharge recommendations: Discharge Facility/Level Of Care Needs:  (return to homeless shelter)     Barriers to discharge: Barriers to Discharge: None    Equipment recommendations: Equipment Needed After Discharge: none     GOALS:    Physical Therapy Goals        Problem: Physical Therapy Goal    Goal Priority Disciplines Outcome Goal Variances Interventions   Physical Therapy Goal     PT/OT, PT      Description:  Goals to be met by: 17    Patient will increase functional independence with mobility by performin. Supine to sit with Modified [place] - not met  2. Sit to stand transfer with Modified [place] - not met  3. Gait  x 220 feet  with Supervision  - not met                      PLAN:    Patient to be seen 5 x/week  to address the above listed problems via gait training, therapeutic activities  Plan of Care expires: 06/23/17  Plan of Care reviewed with: patient         Dewayne Schilling, PTA  05/26/2017

## 2017-05-26 NOTE — PLAN OF CARE
FILIPPO, sitter at BS. Discussed IMM;he verbalized his understanding;initialed it. Copy left at BS. Original placed on chart.        05/26/17 140   Medicare Message   Important Message from Medicare regarding Discharge Appeal Rights Given to patient/caregiver;Explained to patient/caregiver;Signed/date by patient/caregiver   Date IMM was signed 05/26/17   Time IMM was signed [id number]

## 2017-05-27 LAB
ALBUMIN SERPL BCP-MCNC: 1.9 G/DL
ALP SERPL-CCNC: 230 U/L
ALT SERPL W/O P-5'-P-CCNC: 42 U/L
ANION GAP SERPL CALC-SCNC: 5 MMOL/L
AST SERPL-CCNC: 46 U/L
BASOPHILS # BLD AUTO: 0.03 K/UL
BASOPHILS NFR BLD: 0.2 %
BILIRUB SERPL-MCNC: 3.6 MG/DL
BLD PROD TYP BPU: NORMAL
BLD PROD TYP BPU: NORMAL
BLOOD UNIT EXPIRATION DATE: NORMAL
BLOOD UNIT EXPIRATION DATE: NORMAL
BLOOD UNIT TYPE CODE: 6200
BLOOD UNIT TYPE CODE: 6200
BLOOD UNIT TYPE: NORMAL
BLOOD UNIT TYPE: NORMAL
BUN SERPL-MCNC: 10 MG/DL
CALCIUM SERPL-MCNC: 7.4 MG/DL
CHLORIDE SERPL-SCNC: 101 MMOL/L
CO2 SERPL-SCNC: 27 MMOL/L
CODING SYSTEM: NORMAL
CODING SYSTEM: NORMAL
CREAT SERPL-MCNC: 0.6 MG/DL
DIFFERENTIAL METHOD: ABNORMAL
DISPENSE STATUS: NORMAL
DISPENSE STATUS: NORMAL
EOSINOPHIL # BLD AUTO: 0.2 K/UL
EOSINOPHIL NFR BLD: 1 %
ERYTHROCYTE [DISTWIDTH] IN BLOOD BY AUTOMATED COUNT: 17.9 %
EST. GFR  (AFRICAN AMERICAN): >60 ML/MIN/1.73 M^2
EST. GFR  (NON AFRICAN AMERICAN): >60 ML/MIN/1.73 M^2
GLUCOSE SERPL-MCNC: 113 MG/DL
HCT VFR BLD AUTO: 29.6 %
HGB BLD-MCNC: 10 G/DL
INR PPP: 1.2
INR PPP: 1.2
LACTATE SERPL-SCNC: 1.2 MMOL/L
LYMPHOCYTES # BLD AUTO: 3.3 K/UL
LYMPHOCYTES NFR BLD: 17.7 %
MAGNESIUM SERPL-MCNC: 1.8 MG/DL
MAGNESIUM SERPL-MCNC: 1.8 MG/DL
MCH RBC QN AUTO: 29.8 PG
MCHC RBC AUTO-ENTMCNC: 33.8 %
MCV RBC AUTO: 88 FL
MONOCYTES # BLD AUTO: 1.9 K/UL
MONOCYTES NFR BLD: 10.1 %
NEUTROPHILS # BLD AUTO: 13 K/UL
NEUTROPHILS NFR BLD: 70.3 %
PHOSPHATE SERPL-MCNC: 2.7 MG/DL
PHOSPHATE SERPL-MCNC: 2.7 MG/DL
PLATELET # BLD AUTO: 340 K/UL
PMV BLD AUTO: 9.5 FL
POTASSIUM SERPL-SCNC: 3.7 MMOL/L
PROT SERPL-MCNC: 4.7 G/DL
PROTHROMBIN TIME: 12.5 SEC
PROTHROMBIN TIME: 12.5 SEC
RBC # BLD AUTO: 3.36 M/UL
SODIUM SERPL-SCNC: 133 MMOL/L
TRANS ERYTHROCYTES VOL PATIENT: NORMAL ML
TRANS ERYTHROCYTES VOL PATIENT: NORMAL ML
VANCOMYCIN TROUGH SERPL-MCNC: 11.8 UG/ML
WBC # BLD AUTO: 18.46 K/UL

## 2017-05-27 PROCEDURE — 80053 COMPREHEN METABOLIC PANEL: CPT

## 2017-05-27 PROCEDURE — 25000003 PHARM REV CODE 250: Performed by: SURGERY

## 2017-05-27 PROCEDURE — 36415 COLL VENOUS BLD VENIPUNCTURE: CPT

## 2017-05-27 PROCEDURE — 80202 ASSAY OF VANCOMYCIN: CPT

## 2017-05-27 PROCEDURE — 84100 ASSAY OF PHOSPHORUS: CPT

## 2017-05-27 PROCEDURE — 83605 ASSAY OF LACTIC ACID: CPT

## 2017-05-27 PROCEDURE — 94640 AIRWAY INHALATION TREATMENT: CPT

## 2017-05-27 PROCEDURE — 63600175 PHARM REV CODE 636 W HCPCS: Performed by: SURGERY

## 2017-05-27 PROCEDURE — 25000242 PHARM REV CODE 250 ALT 637 W/ HCPCS: Performed by: STUDENT IN AN ORGANIZED HEALTH CARE EDUCATION/TRAINING PROGRAM

## 2017-05-27 PROCEDURE — 63600175 PHARM REV CODE 636 W HCPCS: Performed by: STUDENT IN AN ORGANIZED HEALTH CARE EDUCATION/TRAINING PROGRAM

## 2017-05-27 PROCEDURE — 25000003 PHARM REV CODE 250: Performed by: STUDENT IN AN ORGANIZED HEALTH CARE EDUCATION/TRAINING PROGRAM

## 2017-05-27 PROCEDURE — 20600001 HC STEP DOWN PRIVATE ROOM

## 2017-05-27 PROCEDURE — 85610 PROTHROMBIN TIME: CPT

## 2017-05-27 PROCEDURE — 25000003 PHARM REV CODE 250: Performed by: INTERNAL MEDICINE

## 2017-05-27 PROCEDURE — 94664 DEMO&/EVAL PT USE INHALER: CPT

## 2017-05-27 PROCEDURE — 83735 ASSAY OF MAGNESIUM: CPT

## 2017-05-27 PROCEDURE — 85025 COMPLETE CBC W/AUTO DIFF WBC: CPT

## 2017-05-27 PROCEDURE — 27000221 HC OXYGEN, UP TO 24 HOURS

## 2017-05-27 PROCEDURE — 94761 N-INVAS EAR/PLS OXIMETRY MLT: CPT

## 2017-05-27 PROCEDURE — 63600175 PHARM REV CODE 636 W HCPCS

## 2017-05-27 PROCEDURE — C9113 INJ PANTOPRAZOLE SODIUM, VIA: HCPCS | Performed by: SURGERY

## 2017-05-27 RX ORDER — BISACODYL 10 MG
10 SUPPOSITORY, RECTAL RECTAL ONCE
Status: COMPLETED | OUTPATIENT
Start: 2017-05-27 | End: 2017-05-27

## 2017-05-27 RX ORDER — FUROSEMIDE 10 MG/ML
40 INJECTION INTRAMUSCULAR; INTRAVENOUS ONCE
Status: COMPLETED | OUTPATIENT
Start: 2017-05-27 | End: 2017-05-27

## 2017-05-27 RX ORDER — MAGNESIUM SULFATE HEPTAHYDRATE 40 MG/ML
2 INJECTION, SOLUTION INTRAVENOUS ONCE
Status: COMPLETED | OUTPATIENT
Start: 2017-05-27 | End: 2017-05-27

## 2017-05-27 RX ORDER — POTASSIUM CHLORIDE 7.45 MG/ML
10 INJECTION INTRAVENOUS
Status: COMPLETED | OUTPATIENT
Start: 2017-05-27 | End: 2017-05-27

## 2017-05-27 RX ADMIN — CIPROFLOXACIN 400 MG: 2 INJECTION, SOLUTION INTRAVENOUS at 12:05

## 2017-05-27 RX ADMIN — SODIUM CHLORIDE, SODIUM LACTATE, POTASSIUM CHLORIDE, AND CALCIUM CHLORIDE: .6; .31; .03; .02 INJECTION, SOLUTION INTRAVENOUS at 04:05

## 2017-05-27 RX ADMIN — PANTOPRAZOLE SODIUM 40 MG: 40 INJECTION, POWDER, FOR SOLUTION INTRAVENOUS at 10:05

## 2017-05-27 RX ADMIN — ENOXAPARIN SODIUM 40 MG: 100 INJECTION SUBCUTANEOUS at 04:05

## 2017-05-27 RX ADMIN — MAGNESIUM SULFATE HEPTAHYDRATE 2 G: 40 INJECTION, SOLUTION INTRAVENOUS at 09:05

## 2017-05-27 RX ADMIN — POTASSIUM CHLORIDE 10 MEQ: 10 INJECTION, SOLUTION INTRAVENOUS at 09:05

## 2017-05-27 RX ADMIN — VANCOMYCIN HYDROCHLORIDE 1500 MG: 1 INJECTION, POWDER, LYOPHILIZED, FOR SOLUTION INTRAVENOUS at 01:05

## 2017-05-27 RX ADMIN — IPRATROPIUM BROMIDE AND ALBUTEROL SULFATE 3 ML: .5; 3 SOLUTION RESPIRATORY (INHALATION) at 08:05

## 2017-05-27 RX ADMIN — METRONIDAZOLE 500 MG: 500 INJECTION, SOLUTION INTRAVENOUS at 10:05

## 2017-05-27 RX ADMIN — POTASSIUM CHLORIDE 10 MEQ: 10 INJECTION, SOLUTION INTRAVENOUS at 11:05

## 2017-05-27 RX ADMIN — METRONIDAZOLE 500 MG: 500 INJECTION, SOLUTION INTRAVENOUS at 02:05

## 2017-05-27 RX ADMIN — BISACODYL 10 MG: 10 SUPPOSITORY RECTAL at 09:05

## 2017-05-27 RX ADMIN — IPRATROPIUM BROMIDE AND ALBUTEROL SULFATE 3 ML: .5; 3 SOLUTION RESPIRATORY (INHALATION) at 04:05

## 2017-05-27 RX ADMIN — FUROSEMIDE 40 MG: 10 INJECTION, SOLUTION INTRAMUSCULAR; INTRAVENOUS at 09:05

## 2017-05-27 RX ADMIN — NICOTINE 1 PATCH: 21 PATCH, EXTENDED RELEASE TRANSDERMAL at 09:05

## 2017-05-27 RX ADMIN — IPRATROPIUM BROMIDE AND ALBUTEROL SULFATE 3 ML: .5; 3 SOLUTION RESPIRATORY (INHALATION) at 11:05

## 2017-05-27 RX ADMIN — PROPRANOLOL HYDROCHLORIDE 20 MG: 10 TABLET ORAL at 10:05

## 2017-05-27 RX ADMIN — METRONIDAZOLE 500 MG: 500 INJECTION, SOLUTION INTRAVENOUS at 06:05

## 2017-05-27 RX ADMIN — THIAMINE HCL TAB 100 MG 100 MG: 100 TAB at 09:05

## 2017-05-27 RX ADMIN — SODIUM PHOSPHATE, MONOBASIC, MONOHYDRATE AND SODIUM PHOSPHATE, DIBASIC, ANHYDROUS 30 MMOL: 276; 142 INJECTION, SOLUTION INTRAVENOUS at 09:05

## 2017-05-27 RX ADMIN — PANTOPRAZOLE SODIUM 40 MG: 40 INJECTION, POWDER, FOR SOLUTION INTRAVENOUS at 09:05

## 2017-05-27 RX ADMIN — POTASSIUM CHLORIDE 10 MEQ: 10 INJECTION, SOLUTION INTRAVENOUS at 01:05

## 2017-05-27 RX ADMIN — CIPROFLOXACIN 400 MG: 2 INJECTION, SOLUTION INTRAVENOUS at 11:05

## 2017-05-27 RX ADMIN — IPRATROPIUM BROMIDE AND ALBUTEROL SULFATE 3 ML: .5; 3 SOLUTION RESPIRATORY (INHALATION) at 12:05

## 2017-05-27 NOTE — PLAN OF CARE
Problem: Patient Care Overview  Goal: Plan of Care Review  Outcome: Ongoing (interventions implemented as appropriate)  Plan of care reviewed with patient who verbalized understanding. Alert and oriented when awake, slept between care. Vitals stable and within patient's baseline since admission on 3L O2 nasal cannula. Pain controlled. Tolerating clear liquids, no nausea reported, not much of an appetite. Urinal within reach. Up with stand-by assistance to toilet. Urine output adequate overnight, very dark/tea colored urine. Passing flatus, no BM overnight. Refused TEDs/SCDs. Ambulates well in room. Instructed to call for help as needed, call light in reach and sitter at the bedside. Bed in lowest position and brake set. See flowsheets for detailed assessment. Continuing to monitor.

## 2017-05-27 NOTE — PLAN OF CARE
Problem: Patient Care Overview  Goal: Plan of Care Review  Outcome: Ongoing (interventions implemented as appropriate)  Plan of care reviewed with patient. Patient verbalized understanding.  Patient is AAO and VSS. No complaint of pain during shift.   No complaint of nausea or vomiting. Voids on own with good output. Tolerating 50% of regular diet. Received IV antibiotics. On Cardiac monitoring running NSR.  Weaned off of oxygen.  Received Potassium, magnesium and sodium phos replacement.  Francisco in place to R abdomen, leaking at site. FRANCISCO emptied and recorded every 4 hours.    Ambulates with assistance.  Free of falls and injury.  Will continue to monitor. Cecelia Powers RN

## 2017-05-27 NOTE — SUBJECTIVE & OBJECTIVE
Interval History: No acute events overnight. Denies n/v. Denies flatus.     Medications:  Continuous Infusions:   lactated ringers 125 mL/hr at 05/27/17 0449     Scheduled Meds:   albuterol-ipratropium 2.5mg-0.5mg/3mL  3 mL Nebulization Q4H    ciprofloxacin  400 mg Intravenous Q12H    enoxaparin  40 mg Subcutaneous Daily    metronidazole  500 mg Intravenous Q8H    nicotine  1 patch Transdermal Daily    pantoprazole  40 mg Intravenous BID    propranolol  20 mg Oral BID    thiamine  100 mg Oral Daily    vancomycin (VANCOCIN) IVPB  1,500 mg Intravenous Q12H     PRN Meds:dextrose 50%, dextrose 50%, glucagon (human recombinant), glucose, glucose, ondansetron, pneumoc 13-iris conj-dip cr(PF), sodium chloride 0.9%, tramadol     Review of patient's allergies indicates:  No Known Allergies  Objective:     Vital Signs (Most Recent):  Temp: 98.1 °F (36.7 °C) (05/27/17 0439)  Pulse: 71 (05/27/17 0439)  Resp: 18 (05/27/17 0439)  BP: 127/71 (05/27/17 0439)  SpO2: 95 % (05/27/17 0439) Vital Signs (24h Range):  Temp:  [98.1 °F (36.7 °C)-98.8 °F (37.1 °C)] 98.1 °F (36.7 °C)  Pulse:  [66-97] 71  Resp:  [15-20] 18  SpO2:  [86 %-99 %] 95 %  BP: ()/(61-72) 127/71     Weight: 90.7 kg (200 lb)  Body mass index is 28.7 kg/m².    Intake/Output - Last 3 Shifts       05/25 0700 - 05/26 0659 05/26 0700 - 05/27 0659 05/27 0700 - 05/28 0659    P.O. 150 230     I.V. (mL/kg) 2340 (25.8) 3604.2 (39.7)     IV Piggyback  1250     Total Intake(mL/kg) 2490 (27.5) 5084.2 (56.1)     Urine (mL/kg/hr) 430 (0.2) 1250 (0.6)     Drains 470 (0.2) 670 (0.3)     Stool 0 (0) 0 (0)     Total Output 900 1920      Net +1590 +3164.2             Urine Occurrence 1 x 2 x     Stool Occurrence 2 x 0 x           Physical Exam   Constitutional: He is oriented to person, place, and time. He appears well-developed and well-nourished. No distress.   HENT:   Head: Normocephalic and atraumatic.   Cardiovascular: Normal rate and regular rhythm.    Pulmonary/Chest:  Effort normal and breath sounds normal.   Abdominal: Soft. He exhibits distension. There is no tenderness. There is no rebound and no guarding.   Neurological: He is alert and oriented to person, place, and time.   Skin: Skin is warm and dry.       Significant Labs:  CBC:   Recent Labs  Lab 05/27/17 0452   WBC 18.46*   RBC 3.36*   HGB 10.0*   HCT 29.6*      MCV 88   MCH 29.8   MCHC 33.8     CMP:   Recent Labs  Lab 05/27/17 0452   *   CALCIUM 7.4*   ALBUMIN 1.9*   PROT 4.7*   *   K 3.7   CO2 27      BUN 10   CREATININE 0.6   ALKPHOS 230*   ALT 42   AST 46*   BILITOT 3.6*

## 2017-05-27 NOTE — PROGRESS NOTES
[facility]  General Surgery  Progress Note    Subjective:     History of Present Illness:  55 y/o male with h/o alcohol and tobacco abuse, currently living in a homeless shelter, who was transferred from [place] due to obstructive jaundice.  He was noted to have orange urine and fatigue, and was sent to ED.  Labs demonstrated t bili of 21.5, alk phos 697, INR of 1.3, and wbc of 18.  Labs from 2 yrs ago all wnl. He states he has lost some weight.      He specifically denies abd pain, fevers, chills, itching.      He states he stopped drinking a few weeks ago.      No major medical or surgical history. No family history of malignancy.     Post-Op Info:  Procedure(s) (LRB):  CHOLECYSTECTOMY. Laproscopic converted to open (N/A)  CHOLECYSTECTOMY-LAPAROSCOPIC (N/A)   4 Days Post-Op     Interval History: No acute events overnight. Denies n/v. Denies flatus.     Medications:  Continuous Infusions:   lactated ringers 125 mL/hr at 05/27/17 0449     Scheduled Meds:   albuterol-ipratropium 2.5mg-0.5mg/3mL  3 mL Nebulization Q4H    ciprofloxacin  400 mg Intravenous Q12H    enoxaparin  40 mg Subcutaneous Daily    metronidazole  500 mg Intravenous Q8H    nicotine  1 patch Transdermal Daily    pantoprazole  40 mg Intravenous BID    propranolol  20 mg Oral BID    thiamine  100 mg Oral Daily    vancomycin (VANCOCIN) IVPB  1,500 mg Intravenous Q12H     PRN Meds:dextrose 50%, dextrose 50%, glucagon (human recombinant), glucose, glucose, ondansetron, pneumoc 13-iris conj-dip cr(PF), sodium chloride 0.9%, tramadol     Review of patient's allergies indicates:  No Known Allergies  Objective:     Vital Signs (Most Recent):  Temp: 98.1 °F (36.7 °C) (05/27/17 0439)  Pulse: 71 (05/27/17 0439)  Resp: 18 (05/27/17 0439)  BP: 127/71 (05/27/17 0439)  SpO2: 95 % (05/27/17 0439) Vital Signs (24h Range):  Temp:  [98.1 °F (36.7 °C)-98.8 °F (37.1 °C)] 98.1 °F (36.7 °C)  Pulse:  [66-97] 71  Resp:  [15-20] 18  SpO2:  [86  %-99 %] 95 %  BP: ()/(61-72) 127/71     Weight: 90.7 kg (200 lb)  Body mass index is 28.7 kg/m².    Intake/Output - Last 3 Shifts       05/25 0700 - 05/26 0659 05/26 0700 - 05/27 0659 05/27 0700 - 05/28 0659    P.O. 150 230     I.V. (mL/kg) 2340 (25.8) 3604.2 (39.7)     IV Piggyback  1250     Total Intake(mL/kg) 2490 (27.5) 5084.2 (56.1)     Urine (mL/kg/hr) 430 (0.2) 1250 (0.6)     Drains 470 (0.2) 670 (0.3)     Stool 0 (0) 0 (0)     Total Output 900 1920      Net +1590 +3164.2             Urine Occurrence 1 x 2 x     Stool Occurrence 2 x 0 x           Physical Exam   Constitutional: He is oriented to person, place, and time. He appears well-developed and well-nourished. No distress.   HENT:   Head: Normocephalic and atraumatic.   Cardiovascular: Normal rate and regular rhythm.    Pulmonary/Chest: Effort normal and breath sounds normal.   Abdominal: Soft. He exhibits distension. There is no tenderness. There is no rebound and no guarding.   Neurological: He is alert and oriented to person, place, and time.   Skin: Skin is warm and dry.       Significant Labs:  CBC:   Recent Labs  Lab 05/27/17  0452   WBC 18.46*   RBC 3.36*   HGB 10.0*   HCT 29.6*      MCV 88   MCH 29.8   MCHC 33.8     CMP:   Recent Labs  Lab 05/27/17  0452   *   CALCIUM 7.4*   ALBUMIN 1.9*   PROT 4.7*   *   K 3.7   CO2 27      BUN 10   CREATININE 0.6   ALKPHOS 230*   ALT 42   AST 46*   BILITOT 3.6*           Assessment/Plan:     Hypomagnesemia    Replace prn        Hypoalbuminemia due to protein-calorie malnutrition    Start clears today        Hypophosphatemia    Replace prn        Leukocytosis    Up slightly today, cont cipro, flagyl, vanc for intra-abdominal         Bacteriuria    On cipro        Tobacco abuse    Nicotine patch in place        Hyperammonemia    Ammonia wnl previously, will reorder today        Alcohol abuse    Cont to monitor for signs of withdrawal          * Obstructive jaundice    S/p EUS/ERCP  with AES with duct cleared, no stent placed  Cholecystectomy 5/23- cholecystitis  Tbili trending down, wctm          Clears today  Saline lock IV  Lasix  Continue antibiotics for leukocytosis    Gemini Langley MD  General Surgery  [facility]

## 2017-05-28 LAB
ALBUMIN SERPL BCP-MCNC: 1.9 G/DL
ALP SERPL-CCNC: 214 U/L
ALT SERPL W/O P-5'-P-CCNC: 40 U/L
ANION GAP SERPL CALC-SCNC: 8 MMOL/L
AST SERPL-CCNC: 43 U/L
BASOPHILS # BLD AUTO: 0.04 K/UL
BASOPHILS NFR BLD: 0.2 %
BILIRUB SERPL-MCNC: 3.3 MG/DL
BUN SERPL-MCNC: 13 MG/DL
CALCIUM SERPL-MCNC: 7.6 MG/DL
CHLORIDE SERPL-SCNC: 99 MMOL/L
CO2 SERPL-SCNC: 28 MMOL/L
CREAT SERPL-MCNC: 0.7 MG/DL
DIFFERENTIAL METHOD: ABNORMAL
EOSINOPHIL # BLD AUTO: 0.2 K/UL
EOSINOPHIL NFR BLD: 1.2 %
ERYTHROCYTE [DISTWIDTH] IN BLOOD BY AUTOMATED COUNT: 18.1 %
EST. GFR  (AFRICAN AMERICAN): >60 ML/MIN/1.73 M^2
EST. GFR  (NON AFRICAN AMERICAN): >60 ML/MIN/1.73 M^2
GLUCOSE SERPL-MCNC: 124 MG/DL
HCT VFR BLD AUTO: 30.4 %
HGB BLD-MCNC: 10.3 G/DL
INR PPP: 1.2
LACTATE SERPL-SCNC: 1.5 MMOL/L
LYMPHOCYTES # BLD AUTO: 2.8 K/UL
LYMPHOCYTES NFR BLD: 17.1 %
MAGNESIUM SERPL-MCNC: 1.8 MG/DL
MCH RBC QN AUTO: 29.9 PG
MCHC RBC AUTO-ENTMCNC: 33.9 %
MCV RBC AUTO: 88 FL
MONOCYTES # BLD AUTO: 2.2 K/UL
MONOCYTES NFR BLD: 13.6 %
NEUTROPHILS # BLD AUTO: 10.9 K/UL
NEUTROPHILS NFR BLD: 67.9 %
PHOSPHATE SERPL-MCNC: 3.2 MG/DL
PLATELET # BLD AUTO: 340 K/UL
PMV BLD AUTO: 9.7 FL
POTASSIUM SERPL-SCNC: 3.3 MMOL/L
PROT SERPL-MCNC: 4.9 G/DL
PROTHROMBIN TIME: 12.3 SEC
RBC # BLD AUTO: 3.45 M/UL
SODIUM SERPL-SCNC: 135 MMOL/L
WBC # BLD AUTO: 16.22 K/UL

## 2017-05-28 PROCEDURE — 94664 DEMO&/EVAL PT USE INHALER: CPT

## 2017-05-28 PROCEDURE — 85610 PROTHROMBIN TIME: CPT

## 2017-05-28 PROCEDURE — 84100 ASSAY OF PHOSPHORUS: CPT

## 2017-05-28 PROCEDURE — 25000003 PHARM REV CODE 250: Performed by: INTERNAL MEDICINE

## 2017-05-28 PROCEDURE — 85025 COMPLETE CBC W/AUTO DIFF WBC: CPT

## 2017-05-28 PROCEDURE — 63600175 PHARM REV CODE 636 W HCPCS: Performed by: SURGERY

## 2017-05-28 PROCEDURE — 25000003 PHARM REV CODE 250: Performed by: SURGERY

## 2017-05-28 PROCEDURE — 63600175 PHARM REV CODE 636 W HCPCS

## 2017-05-28 PROCEDURE — 83735 ASSAY OF MAGNESIUM: CPT

## 2017-05-28 PROCEDURE — 94761 N-INVAS EAR/PLS OXIMETRY MLT: CPT

## 2017-05-28 PROCEDURE — 80053 COMPREHEN METABOLIC PANEL: CPT

## 2017-05-28 PROCEDURE — 94640 AIRWAY INHALATION TREATMENT: CPT

## 2017-05-28 PROCEDURE — 20600001 HC STEP DOWN PRIVATE ROOM

## 2017-05-28 PROCEDURE — 25000242 PHARM REV CODE 250 ALT 637 W/ HCPCS: Performed by: STUDENT IN AN ORGANIZED HEALTH CARE EDUCATION/TRAINING PROGRAM

## 2017-05-28 PROCEDURE — 63600175 PHARM REV CODE 636 W HCPCS: Performed by: STUDENT IN AN ORGANIZED HEALTH CARE EDUCATION/TRAINING PROGRAM

## 2017-05-28 PROCEDURE — 25000003 PHARM REV CODE 250: Performed by: STUDENT IN AN ORGANIZED HEALTH CARE EDUCATION/TRAINING PROGRAM

## 2017-05-28 PROCEDURE — 83605 ASSAY OF LACTIC ACID: CPT

## 2017-05-28 PROCEDURE — C9113 INJ PANTOPRAZOLE SODIUM, VIA: HCPCS | Performed by: SURGERY

## 2017-05-28 RX ORDER — POTASSIUM CHLORIDE 20 MEQ/15ML
60 SOLUTION ORAL ONCE
Status: COMPLETED | OUTPATIENT
Start: 2017-05-28 | End: 2017-05-28

## 2017-05-28 RX ORDER — METRONIDAZOLE 500 MG/1
500 TABLET ORAL EVERY 8 HOURS
Status: DISCONTINUED | OUTPATIENT
Start: 2017-05-28 | End: 2017-05-29 | Stop reason: HOSPADM

## 2017-05-28 RX ORDER — CIPROFLOXACIN 500 MG/1
500 TABLET ORAL EVERY 12 HOURS
Status: DISCONTINUED | OUTPATIENT
Start: 2017-05-28 | End: 2017-05-29 | Stop reason: HOSPADM

## 2017-05-28 RX ORDER — MAGNESIUM SULFATE HEPTAHYDRATE 40 MG/ML
2 INJECTION, SOLUTION INTRAVENOUS ONCE
Status: COMPLETED | OUTPATIENT
Start: 2017-05-28 | End: 2017-05-28

## 2017-05-28 RX ADMIN — ENOXAPARIN SODIUM 40 MG: 100 INJECTION SUBCUTANEOUS at 04:05

## 2017-05-28 RX ADMIN — IPRATROPIUM BROMIDE AND ALBUTEROL SULFATE 3 ML: .5; 3 SOLUTION RESPIRATORY (INHALATION) at 03:05

## 2017-05-28 RX ADMIN — THIAMINE HCL TAB 100 MG 100 MG: 100 TAB at 09:05

## 2017-05-28 RX ADMIN — MAGNESIUM SULFATE HEPTAHYDRATE 2 G: 40 INJECTION, SOLUTION INTRAVENOUS at 09:05

## 2017-05-28 RX ADMIN — IPRATROPIUM BROMIDE AND ALBUTEROL SULFATE 3 ML: .5; 3 SOLUTION RESPIRATORY (INHALATION) at 07:05

## 2017-05-28 RX ADMIN — NICOTINE 1 PATCH: 21 PATCH, EXTENDED RELEASE TRANSDERMAL at 09:05

## 2017-05-28 RX ADMIN — METRONIDAZOLE 500 MG: 500 TABLET ORAL at 01:05

## 2017-05-28 RX ADMIN — IPRATROPIUM BROMIDE AND ALBUTEROL SULFATE 3 ML: .5; 3 SOLUTION RESPIRATORY (INHALATION) at 11:05

## 2017-05-28 RX ADMIN — METRONIDAZOLE 500 MG: 500 INJECTION, SOLUTION INTRAVENOUS at 06:05

## 2017-05-28 RX ADMIN — CIPROFLOXACIN HYDROCHLORIDE 500 MG: 500 TABLET, FILM COATED ORAL at 10:05

## 2017-05-28 RX ADMIN — PANTOPRAZOLE SODIUM 40 MG: 40 INJECTION, POWDER, FOR SOLUTION INTRAVENOUS at 10:05

## 2017-05-28 RX ADMIN — IPRATROPIUM BROMIDE AND ALBUTEROL SULFATE 3 ML: .5; 3 SOLUTION RESPIRATORY (INHALATION) at 12:05

## 2017-05-28 RX ADMIN — CIPROFLOXACIN HYDROCHLORIDE 500 MG: 500 TABLET, FILM COATED ORAL at 09:05

## 2017-05-28 RX ADMIN — IPRATROPIUM BROMIDE AND ALBUTEROL SULFATE 3 ML: .5; 3 SOLUTION RESPIRATORY (INHALATION) at 04:05

## 2017-05-28 RX ADMIN — VANCOMYCIN HYDROCHLORIDE 1500 MG: 1 INJECTION, POWDER, LYOPHILIZED, FOR SOLUTION INTRAVENOUS at 02:05

## 2017-05-28 RX ADMIN — PANTOPRAZOLE SODIUM 40 MG: 40 INJECTION, POWDER, FOR SOLUTION INTRAVENOUS at 09:05

## 2017-05-28 RX ADMIN — CIPROFLOXACIN 400 MG: 2 INJECTION, SOLUTION INTRAVENOUS at 01:05

## 2017-05-28 RX ADMIN — METRONIDAZOLE 500 MG: 500 TABLET ORAL at 10:05

## 2017-05-28 RX ADMIN — POTASSIUM CHLORIDE 60 MEQ: 20 SOLUTION ORAL at 09:05

## 2017-05-28 RX ADMIN — PROPRANOLOL HYDROCHLORIDE 20 MG: 10 TABLET ORAL at 10:05

## 2017-05-28 NOTE — PROGRESS NOTES
[facility]  General Surgery  Progress Note    Subjective:     History of Present Illness:  55 y/o male with h/o alcohol and tobacco abuse, currently living in a homeless shelter, who was transferred from [place] due to obstructive jaundice.  He was noted to have orange urine and fatigue, and was sent to ED.  Labs demonstrated t bili of 21.5, alk phos 697, INR of 1.3, and wbc of 18.  Labs from 2 yrs ago all wnl. He states he has lost some weight.      He specifically denies abd pain, fevers, chills, itching.      He states he stopped drinking a few weeks ago.      No major medical or surgical history. No family history of malignancy.     Post-Op Info:  Procedure(s) (LRB):  CHOLECYSTECTOMY. Laproscopic converted to open (N/A)  CHOLECYSTECTOMY-LAPAROSCOPIC (N/A)   5 Days Post-Op     Interval History: No acute events overnight. Tolerated clears. Denies n/v. 1 BM. Ambulated yesterday. States he feels well this morning.     Medications:  Continuous Infusions:   Scheduled Meds:   albuterol-ipratropium 2.5mg-0.5mg/3mL  3 mL Nebulization Q4H    ciprofloxacin  400 mg Intravenous Q12H    enoxaparin  40 mg Subcutaneous Daily    metronidazole  500 mg Intravenous Q8H    nicotine  1 patch Transdermal Daily    pantoprazole  40 mg Intravenous BID    propranolol  20 mg Oral BID    thiamine  100 mg Oral Daily    vancomycin (VANCOCIN) IVPB  1,500 mg Intravenous Q12H     PRN Meds:dextrose 50%, dextrose 50%, glucagon (human recombinant), glucose, glucose, ondansetron, pneumoc 13-iris conj-dip cr(PF), sodium chloride 0.9%, tramadol     Review of patient's allergies indicates:  No Known Allergies  Objective:     Vital Signs (Most Recent):  Temp: 98.1 °F (36.7 °C) (05/28/17 0722)  Pulse: 70 (05/28/17 0730)  Resp: 18 (05/28/17 0730)  BP: 96/63 (05/28/17 0722)  SpO2: 96 % (05/28/17 0730) Vital Signs (24h Range):  Temp:  [97.7 °F (36.5 °C)-98.5 °F (36.9 °C)] 98.1 °F (36.7 °C)  Pulse:  [70-89] 70  Resp:  [12-20]  18  SpO2:  [92 %-97 %] 96 %  BP: ()/(59-77) 96/63     Weight: 90.7 kg (200 lb)  Body mass index is 28.7 kg/m².    Intake/Output - Last 3 Shifts       05/26 0700 - 05/27 0659 05/27 0700 - 05/28 0659 05/28 0700 - 05/29 0659    P.O. 230 1000     I.V. (mL/kg) 3604.2 (39.7)      IV Piggyback 1250 1400     Total Intake(mL/kg) 5084.2 (56.1) 2400 (26.5)     Urine (mL/kg/hr) 1250 (0.6) 1200 (0.6)     Drains 670 (0.3) 255 (0.1)     Stool 0 (0) 0 (0)     Total Output 1920 1455      Net +3164.2 +945             Urine Occurrence 2 x 1 x     Stool Occurrence 0 x 1 x           Physical Exam   Constitutional: He is oriented to person, place, and time. He appears well-developed and well-nourished. No distress.   HENT:   Head: Normocephalic and atraumatic.   Cardiovascular: Normal rate and regular rhythm.    Pulmonary/Chest: Effort normal and breath sounds normal.   Abdominal: Soft. He exhibits no distension. There is no tenderness. There is no rebound and no guarding.   Neurological: He is alert and oriented to person, place, and time.   Skin: Skin is warm and dry.       Significant Labs:  CBC:   Recent Labs  Lab 05/28/17  0414   WBC 16.22*   RBC 3.45*   HGB 10.3*   HCT 30.4*      MCV 88   MCH 29.9   MCHC 33.9     CMP:   Recent Labs  Lab 05/28/17  0414   *   CALCIUM 7.6*   ALBUMIN 1.9*   PROT 4.9*   *   K 3.3*   CO2 28   CL 99   BUN 13   CREATININE 0.7   ALKPHOS 214*   ALT 40   AST 43*   BILITOT 3.3*           Assessment/Plan:     Hypomagnesemia    Replace prn        Hypoalbuminemia due to protein-calorie malnutrition    Start clears today        Hypophosphatemia    Replace prn        Leukocytosis    Up slightly today, cont cipro, flagyl, vanc for intra-abdominal         Bacteriuria    On cipro        Tobacco abuse    Nicotine patch in place        Hyperammonemia    Ammonia wnl previously, will reorder today        Alcohol abuse    Cont to monitor for signs of withdrawal          * Obstructive jaundice     S/p EUS/ERCP with AES with duct cleared, no stent placed  Cholecystectomy 5/23- cholecystitis  On broad spec abx for intra-abdominal infection, will discuss with ID  Tbili trending down, wctm  Regular diet  Saline lock IV            Gemini Langley MD  General Surgery  [facility]

## 2017-05-28 NOTE — PLAN OF CARE
Problem: Patient Care Overview  Goal: Plan of Care Review  Outcome: Ongoing (interventions implemented as appropriate)  Plan of care reviewed with patient who verbalized understanding. Alert and oriented when awake, slept between care. Vitals stable and within patient's baseline since admission room air. Pain controlled with rest and repositioning. Tolerating clear liquids, no nausea reported, ate 75% of dinner. Urinal within reach. Up with stand-by assistance to toilet. Urine output adequate overnight, very dark/tea colored urine. Large loose BM overnight. Refused TEDs/SCDs. Ambulates well in room. Instructed to call for help as needed, call light in reach and frequent rounds made for safety. Bed in lowest position and brake set. See flowsheets for detailed assessment. Continuing to monitor.

## 2017-05-28 NOTE — ASSESSMENT & PLAN NOTE
S/p EUS/ERCP with AES with duct cleared, no stent placed  Cholecystectomy 5/23- cholecystitis  On broad spec abx for intra-abdominal infection, will discuss with ID  Tbili trending down, wctm  Regular diet  Saline lock IV

## 2017-05-28 NOTE — SUBJECTIVE & OBJECTIVE
Interval History: No acute events overnight. Tolerated clears. Denies n/v. 1 BM. Ambulated yesterday. States he feels well this morning.     Medications:  Continuous Infusions:   Scheduled Meds:   albuterol-ipratropium 2.5mg-0.5mg/3mL  3 mL Nebulization Q4H    ciprofloxacin  400 mg Intravenous Q12H    enoxaparin  40 mg Subcutaneous Daily    metronidazole  500 mg Intravenous Q8H    nicotine  1 patch Transdermal Daily    pantoprazole  40 mg Intravenous BID    propranolol  20 mg Oral BID    thiamine  100 mg Oral Daily    vancomycin (VANCOCIN) IVPB  1,500 mg Intravenous Q12H     PRN Meds:dextrose 50%, dextrose 50%, glucagon (human recombinant), glucose, glucose, ondansetron, pneumoc 13-iris conj-dip cr(PF), sodium chloride 0.9%, tramadol     Review of patient's allergies indicates:  No Known Allergies  Objective:     Vital Signs (Most Recent):  Temp: 98.1 °F (36.7 °C) (05/28/17 0722)  Pulse: 70 (05/28/17 0730)  Resp: 18 (05/28/17 0730)  BP: 96/63 (05/28/17 0722)  SpO2: 96 % (05/28/17 0730) Vital Signs (24h Range):  Temp:  [97.7 °F (36.5 °C)-98.5 °F (36.9 °C)] 98.1 °F (36.7 °C)  Pulse:  [70-89] 70  Resp:  [12-20] 18  SpO2:  [92 %-97 %] 96 %  BP: ()/(59-77) 96/63     Weight: 90.7 kg (200 lb)  Body mass index is 28.7 kg/m².    Intake/Output - Last 3 Shifts       05/26 0700 - 05/27 0659 05/27 0700 - 05/28 0659 05/28 0700 - 05/29 0659    P.O. 230 1000     I.V. (mL/kg) 3604.2 (39.7)      IV Piggyback 1250 1400     Total Intake(mL/kg) 5084.2 (56.1) 2400 (26.5)     Urine (mL/kg/hr) 1250 (0.6) 1200 (0.6)     Drains 670 (0.3) 255 (0.1)     Stool 0 (0) 0 (0)     Total Output 1920 1455      Net +3164.2 +945             Urine Occurrence 2 x 1 x     Stool Occurrence 0 x 1 x           Physical Exam   Constitutional: He is oriented to person, place, and time. He appears well-developed and well-nourished. No distress.   HENT:   Head: Normocephalic and atraumatic.   Cardiovascular: Normal rate and regular rhythm.     Pulmonary/Chest: Effort normal and breath sounds normal.   Abdominal: Soft. He exhibits no distension. There is no tenderness. There is no rebound and no guarding.   Neurological: He is alert and oriented to person, place, and time.   Skin: Skin is warm and dry.       Significant Labs:  CBC:   Recent Labs  Lab 05/28/17 0414   WBC 16.22*   RBC 3.45*   HGB 10.3*   HCT 30.4*      MCV 88   MCH 29.9   MCHC 33.9     CMP:   Recent Labs  Lab 05/28/17 0414   *   CALCIUM 7.6*   ALBUMIN 1.9*   PROT 4.9*   *   K 3.3*   CO2 28   CL 99   BUN 13   CREATININE 0.7   ALKPHOS 214*   ALT 40   AST 43*   BILITOT 3.3*

## 2017-05-28 NOTE — PLAN OF CARE
Problem: Patient Care Overview  Goal: Plan of Care Review  Outcome: Ongoing (interventions implemented as appropriate)  Plan of care reviewed with patient. Patient verbalized understanding.  Patient is AAO and VSS. No complaint of pain during shift.   No complaint of nausea or vomiting. Voids on own per urinal. Tolerating about 50% of regular diet.  Started on oral antibiotics.  On Cardiac monitoring running NSR.  Received Potassium and magnesium replacement.  Shay in place to R abdomen.     Ambulates with assistance.  Free of falls and injury.  Will continue to monitor. Cecelia Powers RN

## 2017-05-29 ENCOUNTER — TELEPHONE (OUTPATIENT)
Dept: ENDOSCOPY | Facility: HOSPITAL | Age: 57
End: 2017-05-29

## 2017-05-29 ENCOUNTER — TELEPHONE (OUTPATIENT)
Dept: GASTROENTEROLOGY | Facility: CLINIC | Age: 57
End: 2017-05-29

## 2017-05-29 VITALS
HEART RATE: 68 BPM | DIASTOLIC BLOOD PRESSURE: 58 MMHG | HEIGHT: 70 IN | WEIGHT: 200 LBS | OXYGEN SATURATION: 97 % | RESPIRATION RATE: 12 BRPM | BODY MASS INDEX: 28.63 KG/M2 | TEMPERATURE: 98 F | SYSTOLIC BLOOD PRESSURE: 107 MMHG

## 2017-05-29 DIAGNOSIS — K22.10 ULCER OF ESOPHAGUS WITHOUT BLEEDING: Primary | ICD-10-CM

## 2017-05-29 DIAGNOSIS — K20.90 ESOPHAGITIS: Primary | ICD-10-CM

## 2017-05-29 LAB
ALBUMIN SERPL BCP-MCNC: 1.9 G/DL
ALP SERPL-CCNC: 202 U/L
ALT SERPL W/O P-5'-P-CCNC: 40 U/L
ANION GAP SERPL CALC-SCNC: 7 MMOL/L
AST SERPL-CCNC: 48 U/L
BASOPHILS # BLD AUTO: 0.05 K/UL
BASOPHILS NFR BLD: 0.3 %
BILIRUB SERPL-MCNC: 2.9 MG/DL
BUN SERPL-MCNC: 16 MG/DL
CALCIUM SERPL-MCNC: 7.5 MG/DL
CHLORIDE SERPL-SCNC: 103 MMOL/L
CO2 SERPL-SCNC: 26 MMOL/L
CREAT SERPL-MCNC: 0.7 MG/DL
DIFFERENTIAL METHOD: ABNORMAL
EOSINOPHIL # BLD AUTO: 0.2 K/UL
EOSINOPHIL NFR BLD: 1.1 %
ERYTHROCYTE [DISTWIDTH] IN BLOOD BY AUTOMATED COUNT: 18.2 %
EST. GFR  (AFRICAN AMERICAN): >60 ML/MIN/1.73 M^2
EST. GFR  (NON AFRICAN AMERICAN): >60 ML/MIN/1.73 M^2
GLUCOSE SERPL-MCNC: 92 MG/DL
HCT VFR BLD AUTO: 29.5 %
HGB BLD-MCNC: 10 G/DL
INR PPP: 1.2
LACTATE SERPL-SCNC: 1 MMOL/L
LYMPHOCYTES # BLD AUTO: 2.9 K/UL
LYMPHOCYTES NFR BLD: 19.6 %
MAGNESIUM SERPL-MCNC: 1.7 MG/DL
MCH RBC QN AUTO: 29.9 PG
MCHC RBC AUTO-ENTMCNC: 33.9 %
MCV RBC AUTO: 88 FL
MONOCYTES # BLD AUTO: 1.5 K/UL
MONOCYTES NFR BLD: 10.4 %
NEUTROPHILS # BLD AUTO: 10 K/UL
NEUTROPHILS NFR BLD: 67.9 %
PHOSPHATE SERPL-MCNC: 2.6 MG/DL
PLATELET # BLD AUTO: 313 K/UL
PMV BLD AUTO: 9.5 FL
POTASSIUM SERPL-SCNC: 3.7 MMOL/L
PROT SERPL-MCNC: 4.7 G/DL
PROTHROMBIN TIME: 12.2 SEC
RBC # BLD AUTO: 3.34 M/UL
SODIUM SERPL-SCNC: 136 MMOL/L
WBC # BLD AUTO: 14.76 K/UL

## 2017-05-29 PROCEDURE — 94664 DEMO&/EVAL PT USE INHALER: CPT

## 2017-05-29 PROCEDURE — 63600175 PHARM REV CODE 636 W HCPCS: Performed by: STUDENT IN AN ORGANIZED HEALTH CARE EDUCATION/TRAINING PROGRAM

## 2017-05-29 PROCEDURE — G0009 ADMIN PNEUMOCOCCAL VACCINE: HCPCS | Performed by: STUDENT IN AN ORGANIZED HEALTH CARE EDUCATION/TRAINING PROGRAM

## 2017-05-29 PROCEDURE — C9113 INJ PANTOPRAZOLE SODIUM, VIA: HCPCS | Performed by: SURGERY

## 2017-05-29 PROCEDURE — 83605 ASSAY OF LACTIC ACID: CPT

## 2017-05-29 PROCEDURE — 90670 PCV13 VACCINE IM: CPT | Performed by: STUDENT IN AN ORGANIZED HEALTH CARE EDUCATION/TRAINING PROGRAM

## 2017-05-29 PROCEDURE — 63600175 PHARM REV CODE 636 W HCPCS: Performed by: SURGERY

## 2017-05-29 PROCEDURE — 80053 COMPREHEN METABOLIC PANEL: CPT

## 2017-05-29 PROCEDURE — 85025 COMPLETE CBC W/AUTO DIFF WBC: CPT

## 2017-05-29 PROCEDURE — 94640 AIRWAY INHALATION TREATMENT: CPT

## 2017-05-29 PROCEDURE — 25000003 PHARM REV CODE 250: Performed by: INTERNAL MEDICINE

## 2017-05-29 PROCEDURE — 25000003 PHARM REV CODE 250: Performed by: STUDENT IN AN ORGANIZED HEALTH CARE EDUCATION/TRAINING PROGRAM

## 2017-05-29 PROCEDURE — 63600175 PHARM REV CODE 636 W HCPCS: Performed by: PHYSICIAN ASSISTANT

## 2017-05-29 PROCEDURE — 99900035 HC TECH TIME PER 15 MIN (STAT)

## 2017-05-29 PROCEDURE — 25000003 PHARM REV CODE 250: Performed by: SURGERY

## 2017-05-29 PROCEDURE — 36415 COLL VENOUS BLD VENIPUNCTURE: CPT

## 2017-05-29 PROCEDURE — 83735 ASSAY OF MAGNESIUM: CPT

## 2017-05-29 PROCEDURE — 85610 PROTHROMBIN TIME: CPT

## 2017-05-29 PROCEDURE — 25000242 PHARM REV CODE 250 ALT 637 W/ HCPCS: Performed by: STUDENT IN AN ORGANIZED HEALTH CARE EDUCATION/TRAINING PROGRAM

## 2017-05-29 PROCEDURE — 25000003 PHARM REV CODE 250: Performed by: PHYSICIAN ASSISTANT

## 2017-05-29 PROCEDURE — 84100 ASSAY OF PHOSPHORUS: CPT

## 2017-05-29 PROCEDURE — 97116 GAIT TRAINING THERAPY: CPT

## 2017-05-29 PROCEDURE — 90471 IMMUNIZATION ADMIN: CPT | Performed by: STUDENT IN AN ORGANIZED HEALTH CARE EDUCATION/TRAINING PROGRAM

## 2017-05-29 RX ORDER — PANTOPRAZOLE SODIUM 40 MG/1
40 TABLET, DELAYED RELEASE ORAL DAILY
Status: DISCONTINUED | OUTPATIENT
Start: 2017-05-29 | End: 2017-05-29 | Stop reason: HOSPADM

## 2017-05-29 RX ORDER — METRONIDAZOLE 500 MG/1
500 TABLET ORAL EVERY 8 HOURS
Qty: 30 TABLET | Refills: 0 | Status: SHIPPED | OUTPATIENT
Start: 2017-05-29 | End: 2017-06-08

## 2017-05-29 RX ORDER — OXYCODONE AND ACETAMINOPHEN 5; 325 MG/1; MG/1
1-2 TABLET ORAL EVERY 4 HOURS PRN
Qty: 45 TABLET | Refills: 0 | Status: SHIPPED | OUTPATIENT
Start: 2017-05-29

## 2017-05-29 RX ORDER — MAGNESIUM SULFATE HEPTAHYDRATE 40 MG/ML
2 INJECTION, SOLUTION INTRAVENOUS ONCE
Status: COMPLETED | OUTPATIENT
Start: 2017-05-29 | End: 2017-05-29

## 2017-05-29 RX ORDER — CIPROFLOXACIN 500 MG/1
500 TABLET ORAL EVERY 12 HOURS
Qty: 20 TABLET | Refills: 0 | Status: SHIPPED | OUTPATIENT
Start: 2017-05-29 | End: 2017-06-08

## 2017-05-29 RX ORDER — POTASSIUM CHLORIDE 7.45 MG/ML
10 INJECTION INTRAVENOUS
Status: DISPENSED | OUTPATIENT
Start: 2017-05-29 | End: 2017-05-29

## 2017-05-29 RX ADMIN — PANTOPRAZOLE SODIUM 40 MG: 40 INJECTION, POWDER, FOR SOLUTION INTRAVENOUS at 09:05

## 2017-05-29 RX ADMIN — NICOTINE 1 PATCH: 21 PATCH, EXTENDED RELEASE TRANSDERMAL at 09:05

## 2017-05-29 RX ADMIN — SODIUM PHOSPHATE, MONOBASIC, MONOHYDRATE 30 MMOL: 276; 142 INJECTION, SOLUTION INTRAVENOUS at 12:05

## 2017-05-29 RX ADMIN — POTASSIUM CHLORIDE 10 MEQ: 10 INJECTION, SOLUTION INTRAVENOUS at 12:05

## 2017-05-29 RX ADMIN — METRONIDAZOLE 500 MG: 500 TABLET ORAL at 02:05

## 2017-05-29 RX ADMIN — IPRATROPIUM BROMIDE AND ALBUTEROL SULFATE 3 ML: .5; 3 SOLUTION RESPIRATORY (INHALATION) at 11:05

## 2017-05-29 RX ADMIN — IPRATROPIUM BROMIDE AND ALBUTEROL SULFATE 3 ML: .5; 3 SOLUTION RESPIRATORY (INHALATION) at 03:05

## 2017-05-29 RX ADMIN — IPRATROPIUM BROMIDE AND ALBUTEROL SULFATE 3 ML: .5; 3 SOLUTION RESPIRATORY (INHALATION) at 07:05

## 2017-05-29 RX ADMIN — POTASSIUM CHLORIDE 10 MEQ: 10 INJECTION, SOLUTION INTRAVENOUS at 02:05

## 2017-05-29 RX ADMIN — PROPRANOLOL HYDROCHLORIDE 20 MG: 10 TABLET ORAL at 09:05

## 2017-05-29 RX ADMIN — MAGNESIUM SULFATE HEPTAHYDRATE 2 G: 40 INJECTION, SOLUTION INTRAVENOUS at 09:05

## 2017-05-29 RX ADMIN — IPRATROPIUM BROMIDE AND ALBUTEROL SULFATE 3 ML: .5; 3 SOLUTION RESPIRATORY (INHALATION) at 04:05

## 2017-05-29 RX ADMIN — THIAMINE HCL TAB 100 MG 100 MG: 100 TAB at 09:05

## 2017-05-29 RX ADMIN — METRONIDAZOLE 500 MG: 500 TABLET ORAL at 06:05

## 2017-05-29 RX ADMIN — PNEUMOCOCCAL 13-VALENT CONJUGATE VACCINE 0.5 ML: 2.2; 2.2; 2.2; 2.2; 2.2; 4.4; 2.2; 2.2; 2.2; 2.2; 2.2; 2.2; 2.2 INJECTION, SUSPENSION INTRAMUSCULAR at 04:05

## 2017-05-29 RX ADMIN — CIPROFLOXACIN HYDROCHLORIDE 500 MG: 500 TABLET, FILM COATED ORAL at 09:05

## 2017-05-29 NOTE — DISCHARGE SUMMARY
[facility]  General Surgery  Discharge Summary      Patient Name: Jimmy Pink  MRN: [id number]  Admission Date: 5/20/2017  Hospital Length of Stay: 9 days  Discharge Date and Time:  05/29/2017 8:42 AM  Attending Physician: Moses Boyce MD   Discharging Provider: Tammie Lynn PA-C  Primary Care Provider: Primary Doctor No    HPI:   57 y/o male with h/o alcohol and tobacco abuse, currently living in a homeless shelter, who was transferred from [place] due to obstructive jaundice.  He was noted to have orange urine and fatigue, and was sent to ED.  Labs demonstrated t bili of 21.5, alk phos 697, INR of 1.3, and wbc of 18.  Labs from 2 yrs ago all wnl. He states he has lost some weight.      He specifically denies abd pain, fevers, chills, itching.      He states he stopped drinking a few weeks ago.      No major medical or surgical history. No family history of malignancy.     Procedure(s) (LRB):  CHOLECYSTECTOMY. Laproscopic converted to open (N/A)  CHOLECYSTECTOMY-LAPAROSCOPIC (N/A)      Indwelling Lines/Drains at time of discharge:   Lines/Drains/Airways     Drain                 Closed/Suction Drain 05/23/17 1226 Right RUQ Bulb 19 Fr. 5 days              Hospital Course:    Of note, patient was on hospital medicine from 5/20/17 to 5/23/17. Please see their notes for further details. GI was consulted for obstructive jaundice, and he underwent EUS with stone extraction.  General surgery was consulted on 5/23/17 for choledocholithiasis. He was taken to the OR and underwent: Procedure(s) (LRB):  CHOLECYSTECTOMY. Laproscopic converted to open (N/A)  CHOLECYSTECTOMY-LAPAROSCOPIC (N/A).  He tolerated the procedure well and was transferred to the ICU after recovery from anesthesia. Please see the operative note for further procedure details. Vitals remained stable, and he was afebrile all throughout the post operative period. Labs were reviewed and electrolytes were replaced  appropriately. He was transferred out of the ICU 3 days post-op. Physical exam was appropriate for post operative state. Diet was advanced, and he was able to tolerate a regular diet prior to discharge. He was ambulating without difficulty and had normal bowel function prior to discharge. Patient was deemed suitable for discharge on 6 Days Post-Op. He was discharged home with medications and instructions as below. He voiced understanding of the instructions prior to discharge. He will follow up with GI in 8 weeks to assess for healing of esophagitis.     For more thorough information, please refer to the hospital record and operative report.    Consults:   Consults         Status Ordering Provider     Inpatient consult to [facility] ([facility])  Once     Provider:  (Not yet assigned)    Completed RUBINA MORELAND     Inpatient consult to General Surgery  Once     Provider:  (Not yet assigned)    Completed GAUDENCIO IVAN     Inpatient consult to Midline team  Once     Provider:  (Not yet assigned)    Completed KARISHMA MICHAEL     Inpatient consult to Surgical Oncology  Once     Provider:  (Not yet assigned)    Completed TIFFANIE WILLIS          Significant Diagnostic Studies: Labs:   BMP:   Recent Labs  Lab 05/28/17 0414 05/29/17 0446   * 92   * 136   K 3.3* 3.7   CL 99 103   CO2 28 26   BUN 13 16   CREATININE 0.7 0.7   CALCIUM 7.6* 7.5*   MG 1.8 1.7   , CMP   Recent Labs  Lab 05/28/17 0414 05/29/17 0446   * 136   K 3.3* 3.7   CL 99 103   CO2 28 26   * 92   BUN 13 16   CREATININE 0.7 0.7   CALCIUM 7.6* 7.5*   PROT 4.9* 4.7*   ALBUMIN 1.9* 1.9*   BILITOT 3.3* 2.9*   ALKPHOS 214* 202*   AST 43* 48*   ALT 40 40   ANIONGAP 8 7*   ESTGFRAFRICA >60.0 >60.0   EGFRNONAA >60.0 >60.0   , CBC   Recent Labs  Lab 05/28/17 0414 05/29/17 0446   WBC 16.22* 14.76*   HGB 10.3* 10.0*   HCT 30.4* 29.5*    313   , INR   Lab Results   Component Value Date    INR 1.2  05/29/2017    INR 1.2 05/28/2017    INR 1.2 05/27/2017    INR 1.2 05/27/2017    All labs within the past 24 hours have been reviewed    Radiology: X-Ray: CXR: X-Ray Chest 1 View (CXR):   Results for orders placed or performed during the hospital encounter of 05/20/17   X-Ray Chest 1 View    Narrative    Mild cardiomegaly.  Slight opacification noted the left lung base similar to the previous study.  Upper lung fields are clear.    Impression     See above      Electronically signed by: Dewayne Paez MD  Date:     05/26/17  Time:    12:32       CT scan: CT ABDOMEN PELVIS WITH CONTRAST:   Results for orders placed or performed during the hospital encounter of 05/20/17   CT Abdomen Pelvis With Contrast    Narrative    Triple Phase CT ABDOMEN, and, PELVIS  with IV contrast    Protocol:  Axial images of the abdomen were acquired  after the use of 100 cc Budl951 IV contrast during the arterial phase.Axial images of the abdomen and pelvis were then obtained in the portal venous phase and delayed phase.  Coronal and sagittal reconstructions were also obtained    HISTORY:  56 year old M with obstructive jaundice     COMPARISON: Ultrasound liver 05/20/2017     FINDINGS:  Heart: Normal in size. No pericardial effusion.     Lung Bases: Well aerated, without consolidation or pleural fluid. There is linear band of opacity in the RIGHT lower lobe is consistent with platelike atelectasis or scar.    Liver: Normal in size and attenuation, with no focal hepatic lesions. There is non-opacification of the main portal vein and right and left portal veins without enhancing component .  There is partial occlusion in the superior mesenteric vein. The splenic vein is patent.  The celiac artery, SMA and JAC are patent.      Gallbladder: No calcified gallstones although present on recent ultrasound.     Bile Ducts: The common bile duct measures 2.0 cm maximally with moderate bilobar dilatation of intrahepatic biliary radicles. There is no  obstructing stone or mass in the distal common bile duct by CT.    Pancreas: No mass or peripancreatic fat stranding. The main pancreatic duct is within normal limits.     Spleen: Upper limit of normal in size.     Adrenals: There is a 0.9 cm right adrenal nodule with relative washout of 56% consistent with an adenoma. The left adrenal gland is normal.    GI Tract/Mesentery: No evidence of bowel obstruction.  There is a small hiatal hernia.  There is mild wall thickening of the proximal duodenum, likely reactive.  The appendix is normal.    Kidneys/ Ureters: Normal in size and location. No hydronephrosis or nephrolithiasis. No ureteral dilatation. There is a 1.4 cm round exophytic density in the upper pole the RIGHT kidney probably a cyst.    Bladder: No evidence of wall thickening.     Reproductive organs: Normal.     Retroperitoneum:  No significant adenopathy.      Peritoneal Space: There is a small volume of subdiaphragmatic free fluid bilaterally and scattered in the mesentery and pelvis. No free air. There are several subcentimeter mesenteric lymph nodes, likely reactive.    Abdominal wall:  Normal.     Vasculature: There is moderate calcific aortic atherosclerosis.  There is no aortic aneurysm.     Bones: No acute fracture. Age-appropriate degenerative changes.    Impression         Intra and extrahepatic biliary ductal dilatation without pancreatic ductal dilatation or obstructing stone or mass in the distal common bile duct.  further assessment with ERCP is recommended.    Main portal vein thrombosis involving intrahepatic branches with non-occlusive thrombus in the superior mesenteric vein, with associated upper limit of normal spleen and small volume mesenteric edema related to component of venous congestion.     Small hiatal hernia.    Moderate aortic atherosclerosis.      ______________________________________     Electronically signed by resident: PIETRO CARDENAS MD  Date:      05/22/17  Time:    08:54            As the supervising and teaching physician, I personally reviewed the images and resident's interpretation and I agree with the findings.          Electronically signed by: Rukhsana Casillas MD  Date:     05/22/17  Time:    10:15         Pending Diagnostic Studies:     Procedure Component Value Units Date/Time    CBC auto differential [[id number]] Collected:  05/27/17 0343    Order Status:  Sent Lab Status:  In process Updated:  05/27/17 0343    Specimen:  Blood from Blood     Comprehensive metabolic panel [[id number]] Collected:  05/27/17 0343    Order Status:  Sent Lab Status:  In process Updated:  05/27/17 0343    Specimen:  Blood from Blood     FL ERCP Biliary And Pancreatic [[id number]] Resulted:  05/22/17 1325    Order Status:  Sent Lab Status:  In process Updated:  05/22/17 1505    Hepatitis C genotype [[id number]] Collected:  05/23/17 1116    Order Status:  Sent Lab Status:  In process Updated:  05/23/17 1117    Specimen:  Blood from Blood     Lactic acid, plasma [[id number]] Collected:  05/27/17 0343    Order Status:  Sent Lab Status:  In process Updated:  05/27/17 0343    Specimen:  Blood from Blood     MPN (JAK2 V617F)WITH REFLEX TO CALR,MPL [[id number]] Collected:  05/23/17 1141    Order Status:  Sent Lab Status:  In process Updated:  05/23/17 1141    Specimen:  Blood     Magnesium [[id number]] Collected:  05/27/17 0343    Order Status:  Sent Lab Status:  In process Updated:  05/27/17 0343    Specimen:  Blood from Blood     PNH, PI-LINKED ANTIGENS [[id number]] Collected:  05/23/17 1141    Order Status:  Sent Lab Status:  In process Updated:  05/23/17 1141    Specimen:  Blood from Blood     Phosphorus [[id number]] Collected:  05/27/17 0343    Order Status:  Sent Lab Status:  In process Updated:  05/27/17 0343    Specimen:  Blood from Blood     Protime-INR [[id number]] Collected:  05/27/17 0343    Order Status:  Sent Lab Status:  In process Updated:  05/27/17 0343    Specimen:   Blood from Blood     US Endoscopic Ultrasound [[id number]] Resulted:  05/22/17 1325    Order Status:  Sent Lab Status:  In process Updated:  05/22/17 1505        Final Active Diagnoses:    Diagnosis Date Noted POA    PRINCIPAL PROBLEM:  Obstructive jaundice [K83.8] 05/20/2017 Yes    Hypomagnesemia [E83.42] 05/25/2017 Yes    Hepatitis C virus infection without hepatic coma [B19.20] 05/23/2017 Yes    Choledocholithiasis with obstruction [K80.51] 05/23/2017 Yes    Biliary calculus [K80.20] 05/23/2017 Yes    Jaundice [R17] 05/22/2017 Yes    Portal vein thrombosis [I81] 05/20/2017 Yes    Alcohol abuse [F10.10] 05/20/2017 Yes    Hyperammonemia [E72.20] 05/20/2017 Yes    Tobacco abuse [Z72.0] 05/20/2017 Yes    Bacteriuria [R82.71] 05/20/2017 Yes    Leukocytosis [D72.829] 05/20/2017 Yes    Hypophosphatemia [E83.39] 05/20/2017 Yes    Hypokalemia [E87.6] 05/20/2017 Yes    Transaminitis [R74.0] 05/20/2017 Yes    Hypoalbuminemia due to protein-calorie malnutrition [E46] 05/20/2017 Yes      Problems Resolved During this Admission:    Diagnosis Date Noted Date Resolved POA      Patient Active Problem List   Diagnosis    Portal vein thrombosis    Obstructive jaundice    Alcohol abuse    Hyperammonemia    Tobacco abuse    Bacteriuria    Leukocytosis    Hypophosphatemia    Hypokalemia    Transaminitis    Hypoalbuminemia due to protein-calorie malnutrition    Jaundice    Hepatitis C virus infection without hepatic coma    Choledocholithiasis with obstruction    Biliary calculus    Hypomagnesemia     Discharged Condition: good    Disposition:     Follow Up:  Follow-up Information     Moses Boyce MD In 2 weeks.    Specialty:  General Surgery  Why:  Wound check/Appt: 6/7/17 at 11;00 AM.   Contact information:  [place] [id number]  [phone]             [facility] - Hepatology.    Specialty:  Hepatology  Why:  as directed  Contact information:  [address]  [place] [address] [phone]  Additional information:  1st Floor - [facility], located by clinic tower elevators           [facility] - Gastroenterology.    Specialty:  Gastroenterology  Why:  Repeat EGD in 4-8 weeks to document healing of grade D esophagitis/They will arrange & call you  Contact information:  [address]  [facility] [address] [phone]  Additional information:  Critical access hospital - 4th Floor           [facility] - Hematology Oncology.    Specialty:  Hematology and Oncology  Why:  as directed  Contact information:  [address]  [facility] [address] [phone]  Additional information:  [facility] - 3rd Floor           [facility].    Why:  Homeless Shelter  Contact information:  Caretaker: Erika maryann [phone]               Patient Instructions:     Diet general     Lifting restrictions   Order Comments: No heavy lifting, nothing heavier than 10lbs     Call MD for:  difficulty breathing or increased cough     Call MD for:  redness, tenderness, or signs of infection (pain, swelling, redness, odor or green/yellow discharge around incision site)     Call MD for:  severe uncontrolled pain     Call MD for:  persistent nausea and vomiting or diarrhea     Call MD for:  temperature >100.4     Change dressing (specify)   Order Comments: Steri-strips will remain in place unless they will fall off on their own, or they will be removed in clinic. No bathing, swimming or soaking in a tub, showering is okay.       Medications:  Reconciled Home Medications:   Current Discharge Medication List      START taking these medications    Details   ciprofloxacin HCl (CIPRO) 500 MG tablet Take 1 tablet (500 mg total) by mouth every 12 (twelve) hours.  Qty: 20 tablet, Refills: 0      docusate sodium (COLACE) 50 MG capsule Take 1 capsule (50 mg total) by mouth 2 (two) times daily.  Refills: 0      metronidazole (FLAGYL) 500 MG tablet  Take 1 tablet (500 mg total) by mouth every 8 (eight) hours.  Qty: 30 tablet, Refills: 0      oxycodone-acetaminophen (PERCOCET) 5-325 mg per tablet Take 1-2 tablets by mouth every 4 (four) hours as needed for Pain.  Qty: 45 tablet, Refills: 0      propranolol (INDERAL) 20 MG tablet Take 1 tablet (20 mg total) by mouth 2 (two) times daily.  Qty: 180 tablet, Refills: 3           Time spent on the discharge of patient: 5 minutes    Tammie Lynn PA-C  General Surgery  [facility]

## 2017-05-29 NOTE — NURSING
Education given to pt. Patient verbalized understanding. All questions answered. Peripheral IV removed with catheter intact. RX given to pt. Awaiting transport for discharge.  Will continue to monitor. Cecelia Powers RN

## 2017-05-29 NOTE — TELEPHONE ENCOUNTER
----- Message from Jennifer Owen MD sent at 5/29/2017  1:16 PM CDT -----  esophageal biopsies are only showing an ulcer. Repeat EGD in 4 weeks to check for esophageal                         healing

## 2017-05-29 NOTE — PLAN OF CARE
Visited patient. AAOX4. Calm. Discussed w/him as his CM nurse, I have been talking to Erika, Caretaker of [facility] he is living at, & left her a voice message on her phone today relaying  is discharging him today, w/3 additional prescriptions. I assured him the plan is for her to pick him up & drive him back there & pay for his prescriptions. He verbalized his understanding. Floor nurse, Cecelia, updated.        05/29/17 1040   Medicare Message   Important Message from Medicare regarding Discharge Appeal Rights Given to patient/caregiver;Explained to patient/caregiver;Signed/date by patient/caregiver   Date IMM was signed 05/29/17   Time IMM was signed 0402

## 2017-05-29 NOTE — PT/OT/SLP PROGRESS
Physical Therapy  Treatment    Jimmy Pink   MRN: [id number]   Admitting Diagnosis: Obstructive jaundice    PT Received On: 05/29/17  PT Start Time: 0826     PT Stop Time: 0850    PT Total Time (min): 24 min       Billable Minutes:  Gait [id number]    Treatment Type: Treatment  PT/PTA: PTA     PTA Visit Number: 2       General Precautions: Standard, fall  Orthopedic Precautions:     Braces:      Do you have any cultural, spiritual, Islam conflicts, given your current situation?:  (none)    Subjective:  Communicated with RN prior to session.  I don't feel like getting up    Pain/Comfort  Pain Rating 1: 0/10  Pain Rating Post-Intervention 1: 0/10    Objective:   Patient found with: telemetry    Functional Mobility:  Bed Mobility:   Supine to Sit: Supervision    Transfers:  Sit <> Stand Assistance: Stand By Assistance, Supervision  Sit <> Stand Assistive Device: No Assistive Device    Gait:   Gait Distance: 170 ft with 1 seated rest break between trials  Assistance 1: Stand by Assistance, Supervision  Gait Assistive Device: No device  Gait Pattern: 2-point gait  Gait Deviation(s): forward lean    Therapeutic Activities and Exercises:   Discussed pt progress, safety, importance of OOB mobility for improving outcomes after surgery  and d/c   White board updated   Donned an extra gown and gripping socks    Pt encouraged to ambulate in hallways 3x/day with nursing or family assistance to improve endurance.     AM-PAC 6 CLICK MOBILITY  How much help from another person does this patient currently need?   1 = Unable, Total/Dependent Assistance  2 = A lot, Maximum/Moderate Assistance  3 = A little, Minimum/Contact Guard/Supervision  4 = None, Modified [place]/Independent    Turning over in bed (including adjusting bedclothes, sheets and blankets)?: 4  Sitting down on and standing up from a chair with arms (e.g., wheelchair, bedside commode, etc.): 3  Moving from lying on back to sitting on the side of the bed?:  3  Moving to and from a bed to a chair (including a wheelchair)?: 3  Need to walk in hospital room?: 3  Climbing 3-5 steps with a railing?: 3  Total Score: 19    AM-PAC Raw Score CMS G-Code Modifier Level of Impairment Assistance   6 % Total / Unable   7 - 9 CM 80 - 100% Maximal Assist   10 - 14 CL 60 - 80% Moderate Assist   15 - 19 CK 40 - 60% Moderate Assist   20 - 22 CJ 20 - 40% Minimal Assist   23 CI 1-20% SBA / CGA   24 CH 0% Independent/ Mod I     Patient left up in chair with all lines intact, call button in reach and nsg notified.    Assessment:  Jimmy Pink is a 56 y.o. male with a medical diagnosis of Obstructive jaundice and presents with   continued deficits as listed below. Pt required encouragement to participate in therapy session.Pt  tolerated treatment fairly well and is progressing  with mobility. Pt would continue to benefit from skilled PT to address overall functional mobility and goals. Goals remain appropriate  Rehab identified problem list/impairments: Rehab identified problem list/impairments: gait instability, impaired functional mobilty    Rehab potential is good.    Activity tolerance: Good    Discharge recommendations: Discharge Facility/Level Of Care Needs:  (return to homeless shelter)     Barriers to discharge: Barriers to Discharge: None    Equipment recommendations: Equipment Needed After Discharge: none     GOALS:    Physical Therapy Goals        Problem: Physical Therapy Goal    Goal Priority Disciplines Outcome Goal Variances Interventions   Physical Therapy Goal     PT/OT, PT      Description:  Goals to be met by: 17    Patient will increase functional independence with mobility by performin. Supine to sit with Modified [place] - not met  2. Sit to stand transfer with Modified [place] - not met  3. Gait  x 220 feet with Supervision  - not met                      PLAN:    Patient to be seen 5 x/week  to address the above listed problems via gait  training, therapeutic activities  Plan of Care expires: 06/23/17  Plan of Care reviewed with: patient         Dewayne SARAH Shakir, PTA  05/29/2017

## 2017-05-29 NOTE — PROGRESS NOTES
[facility]  General Surgery  Progress Note    Subjective:     History of Present Illness:  57 y/o male with h/o alcohol and tobacco abuse, currently living in a homeless shelter, who was transferred from [place] due to obstructive jaundice.  He was noted to have orange urine and fatigue, and was sent to ED.  Labs demonstrated t bili of 21.5, alk phos 697, INR of 1.3, and wbc of 18.  Labs from 2 yrs ago all wnl. He states he has lost some weight.      He specifically denies abd pain, fevers, chills, itching.      He states he stopped drinking a few weeks ago.      No major medical or surgical history. No family history of malignancy.     Post-Op Info:  Procedure(s) (LRB):  CHOLECYSTECTOMY. Laproscopic converted to open (N/A)  CHOLECYSTECTOMY-LAPAROSCOPIC (N/A)   6 Days Post-Op     Interval History:   Patient seen and examined, no acute events overnight, tolerating regular diet with no nausea or vomiting, no F/BM, ambulating, ready to go home    Medications:  Continuous Infusions:   Scheduled Meds:   albuterol-ipratropium 2.5mg-0.5mg/3mL  3 mL Nebulization Q4H    ciprofloxacin HCl  500 mg Oral Q12H    enoxaparin  40 mg Subcutaneous Daily    metronidazole  500 mg Oral Q8H    nicotine  1 patch Transdermal Daily    pantoprazole  40 mg Intravenous BID    propranolol  20 mg Oral BID    thiamine  100 mg Oral Daily     PRN Meds:dextrose 50%, dextrose 50%, glucagon (human recombinant), glucose, glucose, ondansetron, pneumoc 13-iris conj-dip cr(PF), sodium chloride 0.9%, tramadol     Review of patient's allergies indicates:  No Known Allergies  Objective:     Vital Signs (Most Recent):  Temp: 97.6 °F (36.4 °C) (05/29/17 0410)  Pulse: 64 (05/29/17 0729)  Resp: 12 (05/29/17 0729)  BP: (!) 108/57 (05/29/17 0410)  SpO2: (!) 94 % (05/29/17 0410) Vital Signs (24h Range):  Temp:  [97.6 °F (36.4 °C)-98.3 °F (36.8 °C)] 97.6 °F (36.4 °C)  Pulse:  [64-80] 64  Resp:  [12-20] 12  SpO2:  [92 %-94 %] 94 %  BP:  (101-117)/(57-67) 108/57     Weight: 90.7 kg (200 lb)  Body mass index is 28.7 kg/m².    Intake/Output - Last 3 Shifts       05/27 0700 - 05/28 0659 05/28 0700 - 05/29 0659 05/29 0700 - 05/30 0659    P.O. 1000 800     I.V. (mL/kg)       IV Piggyback 1400      Total Intake(mL/kg) 2400 (26.5) 800 (8.8)     Urine (mL/kg/hr) 1200 (0.6) 1100 (0.5)     Drains 345 (0.2) 430 (0.2)     Stool 0 (0)      Total Output 1545 1530      Net +855 -730             Urine Occurrence 1 x      Stool Occurrence 1 x            Physical Exam   Constitutional: He is oriented to person, place, and time. He appears well-developed and well-nourished. No distress.   HENT:   Head: Normocephalic and atraumatic.   Cardiovascular: Normal rate and regular rhythm.    Pulmonary/Chest: Effort normal and breath sounds normal.   Abdominal: Soft. He exhibits no distension. There is no tenderness. There is no rebound and no guarding.   Incisions - clean, dry and intact   Neurological: He is alert and oriented to person, place, and time.   Skin: Skin is warm and dry.       Significant Labs:  CBC:   Recent Labs  Lab 05/29/17 0446   WBC 14.76*   RBC 3.34*   HGB 10.0*   HCT 29.5*      MCV 88   MCH 29.9   MCHC 33.9     BMP:   Recent Labs  Lab 05/29/17 0446   GLU 92      K 3.7      CO2 26   BUN 16   CREATININE 0.7   CALCIUM 7.5*   MG 1.7     CMP:   Recent Labs  Lab 05/29/17 0446   GLU 92   CALCIUM 7.5*   ALBUMIN 1.9*   PROT 4.7*      K 3.7   CO2 26      BUN 16   CREATININE 0.7   ALKPHOS 202*   ALT 40   AST 48*   BILITOT 2.9*     LFTs:   Recent Labs  Lab 05/29/17 0446   ALT 40   AST 48*   ALKPHOS 202*   BILITOT 2.9*   PROT 4.7*   ALBUMIN 1.9*     Coagulation:   Recent Labs  Lab 05/23/17  1420  05/29/17 0446   INR 1.1  < > 1.2   APTT 22.3  --   --    < > = values in this interval not displayed.      Assessment/Plan:     Hypomagnesemia    Replace prn        Hypoalbuminemia due to protein-calorie malnutrition    Continue regular  diet        Hypokalemia    Replace         Hypophosphatemia    Replace prn        Leukocytosis    Decreased today, continue cipro/flag                  Tobacco abuse    Nicotine patch in place                  Alcohol abuse    Cont to monitor for signs of withdrawal          * Obstructive jaundice    S/p EUS/ERCP with AES with duct cleared, no stent placed  Cholecystectomy 5/23- cholecystitis  Tbili trending down, wctm  Regular diet  Saline lock IV          Dispo  Plan for d/c today on cipro/flagyl    Tammie Lynn PA-C   [id number]  General Surgery  [facility]

## 2017-05-29 NOTE — PROGRESS NOTES
Pharmacist Intervention IV to PO Note    Jimmy Pink is a 56 y.o. male being treated with IV medication pantoprazole    Patient Data:    Vital Signs (Most Recent):  Temp: 98 °F (36.7 °C) (05/29/17 0756)  Pulse: 69 (05/29/17 0756)  Resp: 14 (05/29/17 0756)  BP: 115/66 (05/29/17 0756)  SpO2: 96 % (05/29/17 0756) Vital Signs (72h Range):  Temp:  [97.6 °F (36.4 °C)-98.8 °F (37.1 °C)]   Pulse:  [64-97]   Resp:  [12-20]   BP: ()/(57-77)   SpO2:  [91 %-99 %]      CBC:    Recent Labs     Lab 05/27/17 0452 05/28/17 0414 05/29/17 0446   WBC 18.46* 16.22* 14.76*   RBC 3.36* 3.45* 3.34*   HGB 10.0* 10.3* 10.0*   HCT 29.6* 30.4* 29.5*    340 313   MCV 88 88 88   MCH 29.8 29.9 29.9   MCHC 33.8 33.9 33.9     CMP:     Recent Labs     Lab 05/27/17 0452 05/28/17 0414 05/29/17 0446   * 124* 92   CALCIUM 7.4* 7.6* 7.5*   ALBUMIN 1.9* 1.9* 1.9*   PROT 4.7* 4.9* 4.7*   * 135* 136   K 3.7 3.3* 3.7   CO2 27 28 26    99 103   BUN 10 13 16   CREATININE 0.6 0.7 0.7   ALKPHOS 230* 214* 202*   ALT 42 40 40   AST 46* 43* 48*   BILITOT 3.6* 3.3* 2.9*       Dietary Orders:  Diet Orders            Diet Adult Regular: Regular starting at 05/28 0758            Based on the following criteria, this patient qualifies for intravenous to oral conversion:  [x] The patients gastrointestinal tract is functioning (tolerating medications via oral or enteral route for 24 hours and tolerating food or enteral feeds for 24 hours).  [x] The patient is hemodynamically stable for 24 hours (heart rate <100 beats per minute, systolic blood pressure >99 mm Hg, and respiratory rate <20 breaths per minute).  [x] The patient shows clinical improvement (afebrile for at least 24 hours and white blood cell count downtrending or normalized). Additionally, the patient must be non-neutropenic (absolute neutrophil count >500 cells/mm3).  [x] For antimicrobials, the patient has received IV therapy for at least 24 hours.    IV medication  pantoprazole 40 mg injection will be changed to oral medication pantoprazole 40 mg oral tab.     Pharmacist's Name: Neymar Walker  Pharmacist's Extension: [phone]

## 2017-05-29 NOTE — PLAN OF CARE
Problem: Physical Therapy Goal  Goal: Physical Therapy Goal  Goals to be met by: 17    Patient will increase functional independence with mobility by performin. Supine to sit with Modified [place] - not met  2. Sit to stand transfer with Modified [place] - not met  3. Gait  x 220 feet with Supervision  - not met     Pt progressing towards goals. continue with PT POC.Goals remain appropriate.       Dewayne Schilling PTA  2017

## 2017-05-29 NOTE — ASSESSMENT & PLAN NOTE
S/p EUS/ERCP with AES with duct cleared, no stent placed  Cholecystectomy 5/23- cholecystitis  Tbili trending down, wctm  Regular diet  Saline lock IV

## 2017-05-29 NOTE — PLAN OF CARE
Problem: Patient Care Overview  Goal: Plan of Care Review  Outcome: Ongoing (interventions implemented as appropriate)  Plan of care reviewed with patient who verbalized understanding. Alert and oriented when awake, slept between care. Vitals stable and within patient's baseline since admission room air. Pain controlled with rest and repositioning. Tolerating diet, no nausea reported, ate 75% of dinner. Urinal within reach. Up with stand-by assistance to toilet. Urine output adequate overnight, very dark/tea colored urine. Loose BM overnight. Refused TEDs/SCDs. Ambulates well in room. Tried to encourage patient to let RN help him take a shower but he refused. Instructed to call for help as needed, call light in reach and frequent rounds made for safety. Bed in lowest position and brake set. See flowsheets for detailed assessment. Continuing to monitor.

## 2017-05-30 NOTE — PT/OT/SLP DISCHARGE
Physical Therapy Discharge Summary    Jimmy Pink  MRN: [id number]   Obstructive jaundice   Patient Discharged from acute Physical Therapy on 17.  Please refer to prior PT noted date on 17 for functional status.     Assessment:   Patient has not met goals.  GOALS:    Physical Therapy Goals     Not on file          Multidisciplinary Problems (Resolved)        Problem: Physical Therapy Goal    Goal Priority Disciplines Outcome Goal Variances Interventions   Physical Therapy Goal   (Resolved)     PT/OT, PT Outcome(s) achieved     Description:  Goals to be met by: 17    Patient will increase functional independence with mobility by performin. Supine to sit with Modified [place] - not met  2. Sit to stand transfer with Modified [place] - not met  3. Gait  x 220 feet with Supervision  - not met                    Reasons for Discontinuation of Therapy Services  Transfer to alternate level of care.      Plan:  Patient Discharged to: [facility] to homeless [facility]..

## 2017-06-05 ENCOUNTER — TELEPHONE (OUTPATIENT)
Dept: GASTROENTEROLOGY | Facility: CLINIC | Age: 57
End: 2017-06-05

## 2017-06-05 NOTE — ANESTHESIA RELEASE NOTE
"Anesthesia Release from PACU Note    Patient: Jimmy Pink    Procedure(s) Performed: Procedure(s) (LRB):  CHOLECYSTECTOMY. Laproscopic converted to open (N/A)  CHOLECYSTECTOMY-LAPAROSCOPIC (N/A)    Anesthesia type: general    Post pain: Adequate analgesia    Post assessment: no apparent anesthetic complications, tolerated procedure well and no evidence of recall    Last Vitals:   Visit Vitals  BP (!) 107/58 (BP Location: Left arm, Patient Position: Lying, BP Method: Automatic)   Pulse 68   Temp 36.8 °C (98.2 °F) (Oral)   Resp 12   Ht 5' 10" (1.778 m)   Wt 90.7 kg (200 lb)   SpO2 97%   BMI 28.70 kg/m²       Post vital signs: stable    Level of consciousness: awake, alert  and oriented    Nausea/Vomiting: no nausea/no vomiting    Complications: none    Airway Patency: patent    Respiratory: unassisted    Cardiovascular: stable and blood pressure at baseline    Hydration: euvolemic  "

## 2017-06-05 NOTE — ANESTHESIA POSTPROCEDURE EVALUATION
"Anesthesia Post Evaluation    Patient: Jimmy Pink    Procedure(s) Performed: Procedure(s) (LRB):  CHOLECYSTECTOMY. Laproscopic converted to open (N/A)  CHOLECYSTECTOMY-LAPAROSCOPIC (N/A)    Final Anesthesia Type: general  Patient location during evaluation: PACU  Patient participation: Yes- Able to Participate  Level of consciousness: awake and alert  Post-procedure vital signs: reviewed and stable  Pain management: adequate  Airway patency: patent  PONV status at discharge: No PONV  Anesthetic complications: no      Cardiovascular status: blood pressure returned to baseline  Respiratory status: unassisted  Hydration status: euvolemic  Follow-up not needed.        Visit Vitals  BP (!) 107/58 (BP Location: Left arm, Patient Position: Lying, BP Method: Automatic)   Pulse 68   Temp 36.8 °C (98.2 °F) (Oral)   Resp 12   Ht 5' 10" (1.778 m)   Wt 90.7 kg (200 lb)   SpO2 97%   BMI 28.70 kg/m²       Pain/Merary Score: No Data Recorded      "

## 2017-06-07 ENCOUNTER — TELEPHONE (OUTPATIENT)
Dept: SURGERY | Facility: CLINIC | Age: 57
End: 2017-06-07

## 2017-06-07 NOTE — TELEPHONE ENCOUNTER
Left message to call the clinic regarding his scheduled appt for today at 11:00.  Pt was instructed to contact the clinic regarding his appt and to possibly reschedule, if he does not present for today's visit.

## 2017-06-14 ENCOUNTER — TELEPHONE (OUTPATIENT)
Dept: GASTROENTEROLOGY | Facility: CLINIC | Age: 57
End: 2017-06-14

## 2017-06-14 NOTE — TELEPHONE ENCOUNTER
Attempted to contact patient to schedule procedure. Lady who answered stated that the number we have is the wrong number. Please advise if you want to send a letter.

## 2017-06-15 NOTE — TELEPHONE ENCOUNTER
Message   Received: Today   Message Contents   MD Rose Barriga MA   Caller: Unspecified (Yesterday, 10:08 AM)             Yes please. Thank you

## 2021-05-18 ENCOUNTER — IMMUNIZATION (OUTPATIENT)
Dept: PRIMARY CARE CLINIC | Facility: CLINIC | Age: 61
End: 2021-05-18
Payer: MEDICARE

## 2021-05-18 DIAGNOSIS — Z23 NEED FOR VACCINATION: Primary | ICD-10-CM

## 2021-05-18 PROCEDURE — 0011A COVID-19, MRNA, LNP-S, PF, 100 MCG/0.5 ML DOSE VACCINE: ICD-10-PCS | Mod: CV19,S$GLB,, | Performed by: FAMILY MEDICINE

## 2021-05-18 PROCEDURE — 0011A COVID-19, MRNA, LNP-S, PF, 100 MCG/0.5 ML DOSE VACCINE: CPT | Mod: CV19,S$GLB,, | Performed by: FAMILY MEDICINE

## 2021-05-18 PROCEDURE — 91301 COVID-19, MRNA, LNP-S, PF, 100 MCG/0.5 ML DOSE VACCINE: CPT | Mod: S$GLB,,, | Performed by: FAMILY MEDICINE

## 2021-05-18 PROCEDURE — 91301 COVID-19, MRNA, LNP-S, PF, 100 MCG/0.5 ML DOSE VACCINE: ICD-10-PCS | Mod: S$GLB,,, | Performed by: FAMILY MEDICINE

## 2021-06-15 ENCOUNTER — IMMUNIZATION (OUTPATIENT)
Dept: PRIMARY CARE CLINIC | Facility: CLINIC | Age: 61
End: 2021-06-15
Payer: MEDICARE

## 2021-06-15 DIAGNOSIS — Z23 NEED FOR VACCINATION: Primary | ICD-10-CM

## 2021-06-15 PROCEDURE — 0012A COVID-19, MRNA, LNP-S, PF, 100 MCG/0.5 ML DOSE VACCINE: CPT | Mod: CV19,PBBFAC | Performed by: FAMILY MEDICINE
